# Patient Record
Sex: MALE | Race: WHITE | Employment: OTHER | URBAN - METROPOLITAN AREA
[De-identification: names, ages, dates, MRNs, and addresses within clinical notes are randomized per-mention and may not be internally consistent; named-entity substitution may affect disease eponyms.]

---

## 2020-08-31 ENCOUNTER — OFFICE VISIT (OUTPATIENT)
Dept: ORTHOPEDIC SURGERY | Age: 70
End: 2020-08-31
Payer: MEDICARE

## 2020-08-31 ENCOUNTER — TELEPHONE (OUTPATIENT)
Dept: ORTHOPEDIC SURGERY | Age: 70
End: 2020-08-31

## 2020-08-31 VITALS — WEIGHT: 175 LBS | BODY MASS INDEX: 23.7 KG/M2 | HEIGHT: 72 IN

## 2020-08-31 PROCEDURE — 1036F TOBACCO NON-USER: CPT | Performed by: ORTHOPAEDIC SURGERY

## 2020-08-31 PROCEDURE — 1123F ACP DISCUSS/DSCN MKR DOCD: CPT | Performed by: ORTHOPAEDIC SURGERY

## 2020-08-31 PROCEDURE — G8427 DOCREV CUR MEDS BY ELIG CLIN: HCPCS | Performed by: ORTHOPAEDIC SURGERY

## 2020-08-31 PROCEDURE — 3017F COLORECTAL CA SCREEN DOC REV: CPT | Performed by: ORTHOPAEDIC SURGERY

## 2020-08-31 PROCEDURE — G8420 CALC BMI NORM PARAMETERS: HCPCS | Performed by: ORTHOPAEDIC SURGERY

## 2020-08-31 PROCEDURE — 4040F PNEUMOC VAC/ADMIN/RCVD: CPT | Performed by: ORTHOPAEDIC SURGERY

## 2020-08-31 PROCEDURE — 99214 OFFICE O/P EST MOD 30 MIN: CPT | Performed by: ORTHOPAEDIC SURGERY

## 2020-08-31 RX ORDER — ALLOPURINOL 100 MG/1
100 TABLET ORAL DAILY
COMMUNITY
Start: 2020-08-18

## 2020-08-31 RX ORDER — DEXTROAMPHETAMINE SACCHARATE, AMPHETAMINE ASPARTATE, DEXTROAMPHETAMINE SULFATE AND AMPHETAMINE SULFATE 2.5; 2.5; 2.5; 2.5 MG/1; MG/1; MG/1; MG/1
10 TABLET ORAL 2 TIMES DAILY
COMMUNITY
Start: 2020-08-30

## 2020-08-31 RX ORDER — TRAZODONE HYDROCHLORIDE 50 MG/1
50 TABLET ORAL NIGHTLY
COMMUNITY

## 2020-08-31 NOTE — PROGRESS NOTES
CHIEF COMPLAINT:    Chief Complaint   Patient presents with    Knee Pain     LEFT KNEE PAIN       HISTORY OF PRESENT ILLNESS:                The patient is a 78 y.o. male who presents to clinic for evaluation of LEFT knee pain. Very pleasant gentleman presents with ongoing Lefton knee pain. He had 2 previous operations and open meniscectomy in [facility] many many years ago. Then he had a large medial incision for what sounds like ligamentous rebalancing in the [facility] out [address].  Again this was done decades ago. He really has not had a whole lot of issues until just recently. In the last couple of weeks he is noted a joint mouse. Is a large piece that primarily resides in his medial gutter but then earlier today jumped into the intercondylar notch in all likelihood. He feels like it locks his knee up very mechanically. Again, he did not really have a whole lot of symptoms other than over-the-counter NSAIDs and played active golf until just recently. 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 This is been extensively worked up. No past medical history on file. Work Status: Tired. Plays a lot of golf. The pain assessment was noted & is as follows:   ]Pain Assessment  Location of Pain: Knee  Location Modifiers: Left  Severity of Pain: 5  Quality of Pain: Buckling, Aching, Dull, Sharp  Duration of Pain: Persistent  Frequency of Pain: Intermittent  Aggravating Factors: Stairs, Walking, Kneeling, Stretching, Bending  Limiting Behavior: Some  Relieving Factors: Rest  Result of Injury: No  Work-Related Injury: No  Are there other pain locations you wish to document?: No]      Work Status/Functionality:     Past Medical History: Medical history form was reviewed today & can be found in the media tab  No past medical history on file. Past Surgical History:     No past surgical history on file.   Current Medications: No current outpatient medications on file. Allergies:  Patient has no allergy information on record. Social History:      Family History:   No family history on file. REVIEW OF SYSTEMS:   For new problems, a full review of systems will be found scanned in the patient's chart. CONSTITUTIONAL: Denies unexplained weight loss, fevers, chills   NEUROLOGICAL: Denies unsteady gait or progressive weakness  SKIN: Denies skin changes, delayed healing, rash, itching       PHYSICAL EXAM:    Vitals: There were no vitals taken for this visit. GENERAL EXAM:  · General Apparence: Patient is adequately groomed with no evidence of malnutrition. · Orientation: The patient is oriented to time, place and person. · Mood & Affect:The patient's mood and affect are appropriate       Left knee PHYSICAL EXAMINATION:  · Inspection: Visible effusion. The patient is mild varus alignment. He has a transverse incision over his medial meniscus and a long medial parapatellar serpentine incision. · Palpation: Minimally tender throughout the knee. Some tightness in the popliteal fossa. Neither he nor I can feel the loose body now. Again, it was in the medial gutter and he can easily move it around before it disappeared this morning. · Range of Motion: 5-1 and 35 degrees    · Strength: 5/5    · Special Tests: ACL MCL PCL and LCL are all intact. He does have mild pain on varus valgus testing at 30 degrees of flexion. · Skin:  There are no rashes, ulcerations or lesions. · There are no distal dysvascular changes     Gait & station:       Additional Examinations:        Straight leg raise examination. Negative logroll examination. Good hip range of motion. Contralateral knee is asymptomatic with excellent range of motion no effusion and no pain. Diagnostic Testing:   The following x rays were read and interpreted by myself      1.  3 x-ray views of the Lefton knee demonstrates near bone-on-bone medial compartment osteoarthritis and moderate patellofemoral disease. Orders   No orders of the defined types were placed in this encounter. Assessment / Treatment Plan:     1. 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 The patient was doing okay until recently. Now, he obviously has a large loose body that is quite symptomatic to him and he wants this removed. I went through in great detail with him that we can remove the loose body arthroscopically but is good to be LEFT with arthritis and this may give him ongoing issues. He voices understanding to this. 2.  He will be a video arthroscopy of the Lefton knee, removal of loose body, chondroplasty and synovectomy. We discussed the risks, benefits, and complications of arthroscopic knee surgery. The patient realizes that there are concerns with this surgery with respect to infection, deep vein thrombosis, neurological injury, delayed  rehabilitation, the possibility of arthrofibrosis of the knee, and specifically  Hoffa's fat pad fibrosis that can potentially cause difficulties. The patient realizes that there are also anesthetic concerns including cardiopulmonary issues, pulmonary issues, and even possibility of death or dystrophy. The patient voiced understanding to this as well as the normal  rehabilitation  that   is involved with weeks of physical therapy, exercise, and strengthening. The patient also realizes that even though the surgery, from a functional perspective, typically allows the patient to return to good function at about 6 weeks, that it often takes 6 months to completely rehabilitate from this operation. The patient also realizes that if there is an arthritic component to the symptoms, then they may still have some degree of arthritis pain. 3. I have personally performed and/or participated in the history, exam and medical decision making and agree with all pertinent clinical information.  I have also reviewed and agree with the past medical, family and social history unless otherwise noted. This dictation was performed with a verbal recognition program (DRAGON) and it was checked for errors. It is possible that there are still dictated errors within this office note. If so, please bring any errors to my attention for an addendum. All efforts were made to ensure that this office note is accurate.           Aiyana Gaytan MD

## 2020-09-01 ENCOUNTER — NURSE ONLY (OUTPATIENT)
Dept: ORTHOPEDIC SURGERY | Age: 70
End: 2020-09-01
Payer: MEDICARE

## 2020-09-01 VITALS — HEIGHT: 72 IN | BODY MASS INDEX: 23.7 KG/M2 | WEIGHT: 175 LBS

## 2020-09-01 RX ORDER — LIDOCAINE HYDROCHLORIDE 10 MG/ML
20 INJECTION, SOLUTION INFILTRATION; PERINEURAL ONCE
Status: COMPLETED | OUTPATIENT
Start: 2020-09-01 | End: 2020-09-01

## 2020-09-01 RX ORDER — METHYLPREDNISOLONE ACETATE 40 MG/ML
80 INJECTION, SUSPENSION INTRA-ARTICULAR; INTRALESIONAL; INTRAMUSCULAR; SOFT TISSUE ONCE
Status: COMPLETED | OUTPATIENT
Start: 2020-09-01 | End: 2020-09-01

## 2020-09-01 RX ORDER — BUPIVACAINE HYDROCHLORIDE 2.5 MG/ML
30 INJECTION, SOLUTION INFILTRATION; PERINEURAL ONCE
Status: COMPLETED | OUTPATIENT
Start: 2020-09-01 | End: 2020-09-01

## 2020-09-01 RX ADMIN — BUPIVACAINE HYDROCHLORIDE 75 MG: 2.5 INJECTION, SOLUTION INFILTRATION; PERINEURAL at 13:57

## 2020-09-01 RX ADMIN — METHYLPREDNISOLONE ACETATE 80 MG: 40 INJECTION, SUSPENSION INTRA-ARTICULAR; INTRALESIONAL; INTRAMUSCULAR; SOFT TISSUE at 13:57

## 2020-09-01 RX ADMIN — LIDOCAINE HYDROCHLORIDE 20 ML: 10 INJECTION, SOLUTION INFILTRATION; PERINEURAL at 13:58

## 2020-09-03 ENCOUNTER — TELEPHONE (OUTPATIENT)
Dept: ORTHOPEDIC SURGERY | Age: 70
End: 2020-09-03

## 2020-09-03 RX ORDER — METHYLPREDNISOLONE 4 MG/1
TABLET ORAL
Qty: 1 KIT | Refills: 0 | Status: SHIPPED | OUTPATIENT
Start: 2020-09-03 | End: 2020-09-16

## 2020-09-03 NOTE — TELEPHONE ENCOUNTER
Patient received a cortisone injection for acute onset of knee swelling. He has a loose body and is awaiting surgery. We have tried to move up the surgery but did not have any current availability. I am hoping that the loose body changes position over the next few days and will be more comfortable. However, in the interim I have offered to call him in an oral steroid. I will send this to his pharmacy.

## 2020-09-14 NOTE — PROGRESS NOTES
Emory Alfaros    Age 78 y.o.    male    1950    N [id number]    9/22/2020  Arrival Time_____________  OR Time____________[address] [place]     Procedure(s):  EXAM UNDER ANESTHESIA VIDEO ARTHROSCOPY LEFT KNEE, REMOVAL OF LOOSE BODY, CHONDROPLASTY, SYNOVECTOMY                      General    Surgeon(s):  Perico Edward, MD       Phone [phone] (home) [phone] (work)    Initals  Date  [address]  Cell Work  _________________________________________________________________  _________________________________________________________________  _________________________________________________________________  _________________________________________________________________  _________________________________________________________________      PCP _____________________________ Phone_________________       H&P__________________Bringing    Chart            Epic  DOS     Called_______  EKG__________________Bringing    Chart            Epic  DOS     Called_______  LAB__________________ Bringing    Chart            Epic  DOS     Called_______  Cardiac Clearance_______Bringing    Chart            Epic      DOS       Called_______    Cardiologist________________________ Phone___________________________      ? Christian concerns / Waiver on Chart            PAT Communications_____________  ? Pre-op Instructions Given [place]          ______________________________  ? Directions to Surgery Center                          ______________________________  ? Transportation Home_______________      _______________________________  ?  Crutches/Walker__________________        _______________________________      ________Pre-op Orders   _______Transcribed    _______Wt.  ________Pharmacy          _______SCD  ______VTE     ______Beta Blocker  ________Consent             ________TED Hiral Starling

## 2020-09-16 ENCOUNTER — OFFICE VISIT (OUTPATIENT)
Dept: PRIMARY CARE CLINIC | Age: 70
End: 2020-09-16
Payer: MEDICARE

## 2020-09-16 PROCEDURE — G8420 CALC BMI NORM PARAMETERS: HCPCS | Performed by: NURSE PRACTITIONER

## 2020-09-16 PROCEDURE — 99211 OFF/OP EST MAY X REQ PHY/QHP: CPT | Performed by: NURSE PRACTITIONER

## 2020-09-16 PROCEDURE — G8428 CUR MEDS NOT DOCUMENT: HCPCS | Performed by: NURSE PRACTITIONER

## 2020-09-16 NOTE — PROGRESS NOTES
Obstructive Sleep Apnea (LOREE) Screening     Patient:  Thierry Alba    YOB: 1950      Medical Record #:  [id number]                     Date:  9/16/2020     1. Are you a loud and/or regular snorer? []  Yes       [x] No    2. Have you been observed to gasp or stop breathing during sleep? []  Yes       [x] No    3. Do you feel tired or groggy upon awakening or do you awaken with a headache?           []  Yes       [] No    4. Are you often tired or fatigued during the wake time hours? []  Yes       [] No    5. Do you fall asleep sitting, reading, watching TV or driving? []  Yes       [] No    6. Do you often have problems with memory or concentration? []  Yes       [] No    **If patient's score is ? 3 they are considered high risk for LOREE. An Anesthesia provider will evaluate the patient and develop a plan of care the day of surgery. Note:  If the patient's BMI is more than 35 kg m¯² , has neck circumference > 40 cm, and/or high blood pressure the risk is greater (© American Sleep Apnea Association, 2006).

## 2020-09-16 NOTE — PROGRESS NOTES
Desiree Edgerton received a viral test for COVID-19. They were educated on isolation and quarantine as appropriate. For any symptoms, they were directed to seek care from their PCP, given contact information to establish with a doctor, directed to an urgent care or the emergency room.

## 2020-09-18 LAB — SARS-COV-2, NAA: NOT DETECTED

## 2020-09-21 ENCOUNTER — ANESTHESIA EVENT (OUTPATIENT)
Dept: OPERATING ROOM | Age: 70
End: 2020-09-21
Payer: MEDICARE

## 2020-09-22 ENCOUNTER — ANESTHESIA (OUTPATIENT)
Dept: OPERATING ROOM | Age: 70
End: 2020-09-22
Payer: MEDICARE

## 2020-09-22 ENCOUNTER — HOSPITAL ENCOUNTER (OUTPATIENT)
Age: 70
Setting detail: OUTPATIENT SURGERY
Discharge: HOME | End: 2020-09-22
Attending: ORTHOPAEDIC SURGERY | Admitting: ORTHOPAEDIC SURGERY
Payer: MEDICARE

## 2020-09-22 VITALS
RESPIRATION RATE: 10 BRPM | OXYGEN SATURATION: 99 % | DIASTOLIC BLOOD PRESSURE: 63 MMHG | SYSTOLIC BLOOD PRESSURE: 109 MMHG

## 2020-09-22 VITALS
HEART RATE: 59 BPM | SYSTOLIC BLOOD PRESSURE: 130 MMHG | TEMPERATURE: 97.1 F | DIASTOLIC BLOOD PRESSURE: 77 MMHG | WEIGHT: 175 LBS | HEIGHT: 72 IN | OXYGEN SATURATION: 100 % | RESPIRATION RATE: 11 BRPM | BODY MASS INDEX: 23.7 KG/M2

## 2020-09-22 PROCEDURE — 7100000010 HC PHASE II RECOVERY - FIRST 15 MIN: Performed by: ORTHOPAEDIC SURGERY

## 2020-09-22 PROCEDURE — 6370000000 HC RX 637 (ALT 250 FOR IP): Performed by: ANESTHESIOLOGY

## 2020-09-22 PROCEDURE — 2709999900 HC NON-CHARGEABLE SUPPLY: Performed by: ORTHOPAEDIC SURGERY

## 2020-09-22 PROCEDURE — 2580000003 HC RX 258: Performed by: ORTHOPAEDIC SURGERY

## 2020-09-22 PROCEDURE — 7100000001 HC PACU RECOVERY - ADDTL 15 MIN: Performed by: ORTHOPAEDIC SURGERY

## 2020-09-22 PROCEDURE — 3700000001 HC ADD 15 MINUTES (ANESTHESIA): Performed by: ORTHOPAEDIC SURGERY

## 2020-09-22 PROCEDURE — 3600000004 HC SURGERY LEVEL 4 BASE: Performed by: ORTHOPAEDIC SURGERY

## 2020-09-22 PROCEDURE — 3700000000 HC ANESTHESIA ATTENDED CARE: Performed by: ORTHOPAEDIC SURGERY

## 2020-09-22 PROCEDURE — 3600000014 HC SURGERY LEVEL 4 ADDTL 15MIN: Performed by: ORTHOPAEDIC SURGERY

## 2020-09-22 PROCEDURE — 6360000002 HC RX W HCPCS: Performed by: ORTHOPAEDIC SURGERY

## 2020-09-22 PROCEDURE — 7100000000 HC PACU RECOVERY - FIRST 15 MIN: Performed by: ORTHOPAEDIC SURGERY

## 2020-09-22 PROCEDURE — 2580000003 HC RX 258: Performed by: ANESTHESIOLOGY

## 2020-09-22 PROCEDURE — 7100000011 HC PHASE II RECOVERY - ADDTL 15 MIN: Performed by: ORTHOPAEDIC SURGERY

## 2020-09-22 PROCEDURE — 2500000003 HC RX 250 WO HCPCS: Performed by: NURSE ANESTHETIST, CERTIFIED REGISTERED

## 2020-09-22 PROCEDURE — 2500000003 HC RX 250 WO HCPCS: Performed by: ORTHOPAEDIC SURGERY

## 2020-09-22 PROCEDURE — 6360000002 HC RX W HCPCS: Performed by: NURSE ANESTHETIST, CERTIFIED REGISTERED

## 2020-09-22 RX ORDER — MEPERIDINE HYDROCHLORIDE 50 MG/ML
12.5 INJECTION INTRAMUSCULAR; INTRAVENOUS; SUBCUTANEOUS EVERY 5 MIN PRN
Status: DISCONTINUED | OUTPATIENT
Start: 2020-09-22 | End: 2020-09-22 | Stop reason: HOSPADM

## 2020-09-22 RX ORDER — LABETALOL HYDROCHLORIDE 5 MG/ML
5 INJECTION, SOLUTION INTRAVENOUS EVERY 10 MIN PRN
Status: DISCONTINUED | OUTPATIENT
Start: 2020-09-22 | End: 2020-09-22 | Stop reason: HOSPADM

## 2020-09-22 RX ORDER — DIPHENHYDRAMINE HYDROCHLORIDE 50 MG/ML
12.5 INJECTION INTRAMUSCULAR; INTRAVENOUS
Status: DISCONTINUED | OUTPATIENT
Start: 2020-09-22 | End: 2020-09-22 | Stop reason: HOSPADM

## 2020-09-22 RX ORDER — MORPHINE SULFATE 2 MG/ML
2 INJECTION, SOLUTION INTRAMUSCULAR; INTRAVENOUS EVERY 5 MIN PRN
Status: DISCONTINUED | OUTPATIENT
Start: 2020-09-22 | End: 2020-09-22 | Stop reason: HOSPADM

## 2020-09-22 RX ORDER — SODIUM CHLORIDE 0.9 % (FLUSH) 0.9 %
10 SYRINGE (ML) INJECTION EVERY 12 HOURS SCHEDULED
Status: DISCONTINUED | OUTPATIENT
Start: 2020-09-22 | End: 2020-09-22 | Stop reason: HOSPADM

## 2020-09-22 RX ORDER — MIDAZOLAM HYDROCHLORIDE 1 MG/ML
INJECTION INTRAMUSCULAR; INTRAVENOUS PRN
Status: DISCONTINUED | OUTPATIENT
Start: 2020-09-22 | End: 2020-09-22 | Stop reason: SDUPTHER

## 2020-09-22 RX ORDER — MORPHINE SULFATE 2 MG/ML
1 INJECTION, SOLUTION INTRAMUSCULAR; INTRAVENOUS EVERY 5 MIN PRN
Status: DISCONTINUED | OUTPATIENT
Start: 2020-09-22 | End: 2020-09-22 | Stop reason: HOSPADM

## 2020-09-22 RX ORDER — FLUTICASONE PROPIONATE 50 MCG
1 SPRAY, SUSPENSION (ML) NASAL DAILY PRN
COMMUNITY

## 2020-09-22 RX ORDER — HYDRALAZINE HYDROCHLORIDE 20 MG/ML
5 INJECTION INTRAMUSCULAR; INTRAVENOUS EVERY 10 MIN PRN
Status: DISCONTINUED | OUTPATIENT
Start: 2020-09-22 | End: 2020-09-22 | Stop reason: HOSPADM

## 2020-09-22 RX ORDER — BUPIVACAINE HYDROCHLORIDE 2.5 MG/ML
INJECTION, SOLUTION INFILTRATION; PERINEURAL PRN
Status: DISCONTINUED | OUTPATIENT
Start: 2020-09-22 | End: 2020-09-22 | Stop reason: ALTCHOICE

## 2020-09-22 RX ORDER — LIDOCAINE HYDROCHLORIDE 10 MG/ML
1 INJECTION, SOLUTION EPIDURAL; INFILTRATION; INTRACAUDAL; PERINEURAL
Status: DISCONTINUED | OUTPATIENT
Start: 2020-09-22 | End: 2020-09-22 | Stop reason: HOSPADM

## 2020-09-22 RX ORDER — SODIUM CHLORIDE 0.9 % (FLUSH) 0.9 %
10 SYRINGE (ML) INJECTION PRN
Status: DISCONTINUED | OUTPATIENT
Start: 2020-09-22 | End: 2020-09-22 | Stop reason: HOSPADM

## 2020-09-22 RX ORDER — OXYCODONE HYDROCHLORIDE AND ACETAMINOPHEN 5; 325 MG/1; MG/1
2 TABLET ORAL PRN
Status: COMPLETED | OUTPATIENT
Start: 2020-09-22 | End: 2020-09-22

## 2020-09-22 RX ORDER — FENTANYL CITRATE 50 UG/ML
INJECTION, SOLUTION INTRAMUSCULAR; INTRAVENOUS PRN
Status: DISCONTINUED | OUTPATIENT
Start: 2020-09-22 | End: 2020-09-22 | Stop reason: SDUPTHER

## 2020-09-22 RX ORDER — DEXAMETHASONE SODIUM PHOSPHATE 4 MG/ML
INJECTION, SOLUTION INTRA-ARTICULAR; INTRALESIONAL; INTRAMUSCULAR; INTRAVENOUS; SOFT TISSUE PRN
Status: DISCONTINUED | OUTPATIENT
Start: 2020-09-22 | End: 2020-09-22 | Stop reason: SDUPTHER

## 2020-09-22 RX ORDER — ONDANSETRON 2 MG/ML
INJECTION INTRAMUSCULAR; INTRAVENOUS PRN
Status: DISCONTINUED | OUTPATIENT
Start: 2020-09-22 | End: 2020-09-22 | Stop reason: SDUPTHER

## 2020-09-22 RX ORDER — LIDOCAINE HYDROCHLORIDE 20 MG/ML
INJECTION, SOLUTION INFILTRATION; PERINEURAL PRN
Status: DISCONTINUED | OUTPATIENT
Start: 2020-09-22 | End: 2020-09-22 | Stop reason: SDUPTHER

## 2020-09-22 RX ORDER — PROMETHAZINE HYDROCHLORIDE 25 MG/ML
6.25 INJECTION, SOLUTION INTRAMUSCULAR; INTRAVENOUS
Status: DISCONTINUED | OUTPATIENT
Start: 2020-09-22 | End: 2020-09-22 | Stop reason: HOSPADM

## 2020-09-22 RX ORDER — OXYCODONE HYDROCHLORIDE AND ACETAMINOPHEN 5; 325 MG/1; MG/1
1 TABLET ORAL PRN
Status: COMPLETED | OUTPATIENT
Start: 2020-09-22 | End: 2020-09-22

## 2020-09-22 RX ORDER — SODIUM CHLORIDE, SODIUM LACTATE, POTASSIUM CHLORIDE, CALCIUM CHLORIDE 600; 310; 30; 20 MG/100ML; MG/100ML; MG/100ML; MG/100ML
INJECTION, SOLUTION INTRAVENOUS CONTINUOUS
Status: DISCONTINUED | OUTPATIENT
Start: 2020-09-22 | End: 2020-09-22 | Stop reason: HOSPADM

## 2020-09-22 RX ORDER — ONDANSETRON 2 MG/ML
4 INJECTION INTRAMUSCULAR; INTRAVENOUS PRN
Status: DISCONTINUED | OUTPATIENT
Start: 2020-09-22 | End: 2020-09-22 | Stop reason: HOSPADM

## 2020-09-22 RX ORDER — PROPOFOL 10 MG/ML
INJECTION, EMULSION INTRAVENOUS PRN
Status: DISCONTINUED | OUTPATIENT
Start: 2020-09-22 | End: 2020-09-22 | Stop reason: SDUPTHER

## 2020-09-22 RX ORDER — HYDROCODONE BITARTRATE AND ACETAMINOPHEN 5; 325 MG/1; MG/1
1 TABLET ORAL EVERY 4 HOURS PRN
Qty: 30 TABLET | Refills: 0 | Status: SHIPPED | OUTPATIENT
Start: 2020-09-22 | End: 2020-09-27

## 2020-09-22 RX ORDER — SODIUM CHLORIDE, SODIUM LACTATE, POTASSIUM CHLORIDE, AND CALCIUM CHLORIDE .6; .31; .03; .02 G/100ML; G/100ML; G/100ML; G/100ML
IRRIGANT IRRIGATION PRN
Status: DISCONTINUED | OUTPATIENT
Start: 2020-09-22 | End: 2020-09-22 | Stop reason: ALTCHOICE

## 2020-09-22 RX ORDER — ASPIRIN 325 MG
325 TABLET, DELAYED RELEASE (ENTERIC COATED) ORAL DAILY
Qty: 14 TABLET | Refills: 0 | Status: SHIPPED | OUTPATIENT
Start: 2020-09-22 | End: 2020-10-06

## 2020-09-22 RX ADMIN — FENTANYL CITRATE 50 MCG: 50 INJECTION INTRAMUSCULAR; INTRAVENOUS at 10:47

## 2020-09-22 RX ADMIN — CEFAZOLIN SODIUM 2 G: 10 INJECTION, POWDER, FOR SOLUTION INTRAVENOUS at 10:44

## 2020-09-22 RX ADMIN — LIDOCAINE HYDROCHLORIDE 60 MG: 20 INJECTION, SOLUTION INFILTRATION; PERINEURAL at 10:47

## 2020-09-22 RX ADMIN — MIDAZOLAM HYDROCHLORIDE 2 MG: 2 INJECTION, SOLUTION INTRAMUSCULAR; INTRAVENOUS at 10:44

## 2020-09-22 RX ADMIN — PROPOFOL 200 MG: 10 INJECTION, EMULSION INTRAVENOUS at 10:47

## 2020-09-22 RX ADMIN — ONDANSETRON 4 MG: 2 INJECTION INTRAMUSCULAR; INTRAVENOUS at 10:50

## 2020-09-22 RX ADMIN — OXYCODONE HYDROCHLORIDE AND ACETAMINOPHEN 2 TABLET: 5; 325 TABLET ORAL at 11:57

## 2020-09-22 RX ADMIN — SODIUM CHLORIDE, POTASSIUM CHLORIDE, SODIUM LACTATE AND CALCIUM CHLORIDE: 600; 310; 30; 20 INJECTION, SOLUTION INTRAVENOUS at 08:51

## 2020-09-22 RX ADMIN — DEXAMETHASONE SODIUM PHOSPHATE 6 MG: 4 INJECTION, SOLUTION INTRAMUSCULAR; INTRAVENOUS at 10:50

## 2020-09-22 ASSESSMENT — PAIN SCALES - GENERAL
PAINLEVEL_OUTOF10: 0
PAINLEVEL_OUTOF10: 7
PAINLEVEL_OUTOF10: 6
PAINLEVEL_OUTOF10: 0
PAINLEVEL_OUTOF10: 7
PAINLEVEL_OUTOF10: 0
PAINLEVEL_OUTOF10: 6
PAINLEVEL_OUTOF10: 7

## 2020-09-22 ASSESSMENT — PULMONARY FUNCTION TESTS
PIF_VALUE: 1
PIF_VALUE: 0
PIF_VALUE: 6
PIF_VALUE: 22
PIF_VALUE: 2
PIF_VALUE: 2
PIF_VALUE: 22
PIF_VALUE: 15
PIF_VALUE: 12
PIF_VALUE: 2
PIF_VALUE: 0
PIF_VALUE: 21
PIF_VALUE: 2
PIF_VALUE: 2
PIF_VALUE: 1
PIF_VALUE: 2
PIF_VALUE: 23
PIF_VALUE: 2
PIF_VALUE: 2
PIF_VALUE: 13
PIF_VALUE: 2
PIF_VALUE: 2
PIF_VALUE: 13
PIF_VALUE: 2
PIF_VALUE: 12
PIF_VALUE: 22
PIF_VALUE: 11
PIF_VALUE: 13
PIF_VALUE: 1
PIF_VALUE: 22
PIF_VALUE: 2
PIF_VALUE: 0
PIF_VALUE: 22
PIF_VALUE: 24
PIF_VALUE: 12

## 2020-09-22 NOTE — H&P
I have reviewed the history and physical and examined the patient and find no relevant changes. I have reviewed with the patient and/or family the risks, benefits, and alternatives to the procedure. Controlled Substance Monitoring:    Acute and Chronic Pain Monitoring:   RX Monitoring 9/22/2020   Acute Pain Prescriptions Severe pain not adequately treated with lower dose. Periodic Controlled Substance Monitoring No signs of potential drug abuse or diversion identified. Patient being given increased dosage/quantity of opoid pain medication in excess of OSMB guidelines which noted a 30 MED daily of opioids due to the fact that he/she has undergone orthopaedic surgery as outlined in rule 4731-11-13. Dosages and further duration of the pain medication will be re-evaluated at her post op visit. Patient was educated on dosing expectations and limits of prescribing as a result of the new [facility] Board rules enacted August 31, 2017. OARRS report has also been utilized to screen for any abuse history or suspicious activity as outlined in [place]. All efforts have been taken to prevent abuse potential and misuse of opioid medications including education, screening, and close clinical follow up.

## 2020-09-22 NOTE — ANESTHESIA PRE PROCEDURE
Department of Anesthesiology  Preprocedure Note       Name:  Desiree Bhakta   Age:  78 y.o.  :  1950                                          MRN:  [id number]         Date:  2020      Surgeon: Ralf Harper):  Mono Mendoza MD    Procedure: Procedure(s):  EXAM UNDER ANESTHESIA VIDEO ARTHROSCOPY LEFT KNEE, REMOVAL OF LOOSE BODY, CHONDROPLASTY, SYNOVECTOMY    Medications prior to admission:   Prior to Admission medications    Medication Sig Start Date End Date Taking? Authorizing Provider   fluticasone (FLONASE) 50 MCG/ACT nasal spray 1 spray by Each Nostril route daily as needed for Rhinitis   Yes Historical Provider, MD   amphetamine-dextroamphetamine (ADDERALL) 10 MG tablet Take 10 mg by mouth 2 times daily. 20  Yes Historical Provider, MD   traZODone (DESYREL) 50 MG tablet Take 50 mg by mouth nightly   Yes Historical Provider, MD   allopurinol (ZYLOPRIM) 100 MG tablet Take 100 mg by mouth daily  20  Yes Historical Provider, MD       Current medications:    Current Facility-Administered Medications   Medication Dose Route Frequency Provider Last Rate Last Dose    ceFAZolin (ANCEF) 2 g in dextrose 5 % 100 mL IVPB  2 g Intravenous On Call to Keisha Morse MD        lactated ringers infusion   Intravenous Continuous Dina Alves MD        sodium chloride flush 0.9 % injection 10 mL  10 mL Intravenous 2 times per day Dina Alves MD        sodium chloride flush 0.9 % injection 10 mL  10 mL Intravenous PRN Dina Alves MD        lidocaine PF 1 % injection 1 mL  1 mL Intradermal Once PRN Dina Alves MD           Allergies: Allergies   Allergen Reactions    Mometasone Furo-Formoterol Fum Other (See Comments)       Problem List:  There is no problem list on file for this patient.       Past Medical History:        Diagnosis Date    Arthritis     Cancer [facility])     prostate    Lung infection     recurring lung infection- do not not what the cause is       Past Surgical History:        Procedure Laterality Date    CERVICAL DISC SURGERY      COLECTOMY      due to diverticulitis    KNEE ARTHROSCOPY Left     X 2    PROSTATECTOMY      SHOULDER ARTHROSCOPY Bilateral     acromioplasty       Social History:    Social History     Tobacco Use    Smoking status: Former Smoker     Types: Cigarettes     Last attempt to quit: 1980     Years since quittin.0    Smokeless tobacco: Never Used   Substance Use Topics    Alcohol use: Yes     Comment: 14 drinks per week                                Counseling given: Not Answered      Vital Signs (Current):   Vitals:    20 1523   Weight: 175 lb (79.4 kg)   Height: 6' (1.829 m)                                              BP Readings from Last 3 Encounters:   No data found for BP       NPO Status: Time of last liquid consumption:                         Time of last solid consumption:                         Date of last liquid consumption: 20                        Date of last solid food consumption: 20    BMI:   Wt Readings from Last 3 Encounters:   20 175 lb (79.4 kg)   20 175 lb (79.4 kg)   20 175 lb (79.4 kg)     Body mass index is 23.73 kg/m². CBC: No results found for: WBC, RBC, HGB, HCT, MCV, RDW, PLT    CMP: No results found for: NA, K, CL, CO2, BUN, CREATININE, GFRAA, AGRATIO, LABGLOM, GLUCOSE, PROT, CALCIUM, BILITOT, ALKPHOS, AST, ALT    POC Tests: No results for input(s): POCGLU, POCNA, POCK, POCCL, POCBUN, POCHEMO, POCHCT in the last 72 hours.     Coags: No results found for: PROTIME, INR, APTT    HCG (If Applicable): No results found for: PREGTESTUR, PREGSERUM, HCG, HCGQUANT     ABGs: No results found for: PHART, PO2ART, UAP3CIR, UKQ0WEW, BEART, I3YEOPBY     Type & Screen (If Applicable):  No results found for: LABABO, LABRH    Drug/Infectious Status (If Applicable):  No results found for: HIV, HEPCAB    COVID-19 Screening (If Applicable):   Lab Results   Component Value Date    COVID19 NOT DETECTED 2020         Anesthesia Evaluation  Patient summary reviewed no history of anesthetic complications:   Airway: Mallampati: II  TM distance: >3 FB   Neck ROM: full  Mouth opening: > = 3 FB Dental: normal exam         Pulmonary:Negative Pulmonary ROS and normal exam  breath sounds clear to auscultation                            ROS comment: Recurrent lung infxn   Cardiovascular:Negative CV ROS  Exercise tolerance: good (>4 METS),           Rhythm: regular  Rate: normal                    Neuro/Psych:   Negative Neuro/Psych ROS              GI/Hepatic/Renal: Neg GI/Hepatic/Renal ROS            Endo/Other: Negative Endo/Other ROS                    Abdominal:           Vascular: negative vascular ROS. Pre-Operative Diagnosis: Loose body of left knee [M23.42]    78 y.o.   BMI:  Body mass index is 23.73 kg/m². Vitals:    20 1523   Weight: 175 lb (79.4 kg)   Height: 6' (1.829 m)       Allergies   Allergen Reactions    Mometasone Furo-Formoterol Fum Other (See Comments)       Social History     Tobacco Use    Smoking status: Former Smoker     Types: Cigarettes     Last attempt to quit: 1980     Years since quittin.0    Smokeless tobacco: Never Used   Substance Use Topics    Alcohol use: Yes     Comment: 14 drinks per week       LABS:    CBC  No results found for: WBC, HGB, HCT, PLT  RENAL  No results found for: NA, K, CL, CO2, BUN, CREATININE, GLUCOSE  COAGS  No results found for: PROTIME, INR, APTT          Anesthesia Plan      MAC     ASA 2     (I discussed with the patient the risks and benefits of PIV, anesthesia, IV Narcotics, PACU. All questions were answered the patient agrees with the plan and wishes to proceed)  Induction: intravenous.                           Shanice Benson MD   2020

## 2020-09-22 NOTE — PROGRESS NOTES
Pre and post operative expectations and routines explained to patient, verbalized understanding. Preoperative Screening for Elective Surgery/Invasive Procedures While COVID-19 present in the community     Have you tested positive or have been told to self-isolate for COVID-19 like symptoms within the past 28 days? NO   Do you currently have any of the following symptoms? No  o Fever >100.0 F or 99.9 F in immunocompromised patients? No  o New onset cough, shortness of breath or difficulty breathing? No  o New onset sore throat, myalgia (muscle aches and pains), headache, loss of taste/smell or diarrhea? No   Have you had a potential exposure to COVID-19 within the past 14 days by:  o Close contact with a confirmed case? No  o Close contact with a healthcare worker,  or essential infrastructure worker (grocery store, [employer], gas station, public utilities or transportation)? No  o Do you reside in a congregate setting such as; skilled nursing facility, adult home, correctional facility, homeless shelter or other institutional setting? No  o Have you had recent travel to a known COVID-19 hotspot? No    Indicate if the patient has a positive screen by answering yes to one or more of the above questions. Patients who test positive or screen positive prior to surgery or on the day of surgery should be evaluated in conjunction with the surgeon/proceduralist/anesthesiologist to determine the urgency of the procedure. Patient states he has crutches and knows how to use them.

## 2020-09-22 NOTE — PROGRESS NOTES
Patient awake alert ready to go home. Discharged in no distress accompanied to passenger side of car with family or significant other driving car. Assessment unchanged. Patient states pain slightly diminished.

## 2020-09-22 NOTE — BRIEF OP NOTE
Brief Postoperative Note      Patient: Emilie Perkins  YOB: 1950  MRN: [id number]    Date of Procedure: 9/22/2020    Pre-Op Diagnosis: Loose body of left knee [M23.42]    Post-Op Diagnosis: Same       Procedure(s):  EXAM UNDER ANESTHESIA VIDEO ARTHROSCOPY LEFT KNEE, REMOVAL OF LOOSE BODY, CHONDROPLASTY, SYNOVECTOMY    Surgeon(s):  Tia Rodríguez MD    Assistant:  Surgical Assistant: Paula Dawson  Physician Assistant: OTONIEL Schultz    Anesthesia: General    Estimated Blood Loss (mL): Minimal    Complications: None    Specimens:   * No specimens in log *    Implants:  * No implants in log *      Drains: * No LDAs found *    Findings: same    Electronically signed by OTONIEL Schultz on 9/22/2020 at 11:04 AM

## 2020-09-22 NOTE — ANESTHESIA POSTPROCEDURE EVALUATION
Department of Anesthesiology  Postprocedure Note    Patient: Justin Orellana  MRN: [id number]  YOB: 1950  Date of evaluation: 9/22/2020  Time:  3:34 PM     Procedure Summary     Date:  09/22/20 Room / Location:  [address]    Anesthesia Start:  4228 Anesthesia Stop:  7325    Procedure:  EXAM UNDER ANESTHESIA VIDEO ARTHROSCOPY LEFT KNEE, REMOVAL OF MULTIPLE LOOSE BODIES, CHONDROPLASTY, SYNOVECTOMY (Left Knee) Diagnosis:       Loose body of left knee      (Loose body of left knee [M23.42])    Surgeon:  Diane Guevara MD Responsible Provider:  Cheri Leal MD    Anesthesia Type:  MAC ASA Status:  2          Anesthesia Type: MAC    Zaina Phase I: Zaina Score: 9    Zaina Phase II: Zaina Score: 10    Last vitals: Reviewed and per EMR flowsheets.        Anesthesia Post Evaluation    Comments: Postoperative Anesthesia Note    Name:    Justin Orellana  MRN:      [id number]    Patient Vitals in the past 12 hrs:  09/22/20 1230, BP:130/77, Pulse:59, Resp:11, SpO2:100 %  09/22/20 1220, BP:129/78, Pulse:57, Resp:9, SpO2:100 %  09/22/20 1218, Pulse:58  09/22/20 1210, BP:105/61, Pulse:57, Resp:16, SpO2:98 %  09/22/20 1200, BP:121/76, Pulse:59, Resp:13, SpO2:98 %  09/22/20 1155, BP:117/76, Pulse:60, Resp:11, SpO2:99 %  09/22/20 1150, BP:131/74, Pulse:58, Resp:13, SpO2:98 %  09/22/20 1148, Pulse:64, SpO2:98 %  09/22/20 1146, BP:104/72, Pulse:64, Resp:22, SpO2:97 %  09/22/20 1145, BP:104/72, Temp:97.1 °F (36.2 °C), Pulse:54, Resp:11, SpO2:97 %  09/22/20 1140, BP:105/69, Pulse:55, Resp:10, SpO2:97 %  09/22/20 1135, BP:106/69, Pulse:55, Resp:11, SpO2:97 %  09/22/20 1133, BP:106/65, Temp:97 °F (36.1 °C), Temp src:Temporal, Pulse:55, Resp:12, SpO2:97 %     LABS:    CBC  No results found for: WBC, HGB, HCT, PLT  RENAL  No results found for: NA, K, CL, CO2, BUN, CREATININE, GLUCOSE  COAGS  No results found for: PROTIME, INR, APTT    Intake & Output:  @24HRIO@    Nausea & Vomiting: No    Level of Consciousness:  Awake    Pain Assessment:  Adequate analgesia    Anesthesia Complications:  No apparent anesthetic complications    SUMMARY      Vital signs stable  OK to discharge from Stage I post anesthesia care.   Care transferred from Anesthesiology department on discharge from perioperative area

## 2020-09-23 NOTE — OP NOTE
the loose bodies in hopes of trying to  give him some relief from the synovitis and arthritis pain and  mechanical symptoms. He like to have this done. We did discuss the  risks, benefits, and potential complications of the operation as well as  a normal rehabilitative protocol. He voiced understanding to the fact  that he may very well continue to have a significant arthritis pain and  effusions, but he also understood the concerns regarding infection, deep  vein thrombosis,  pulmonary embolism, arthrofibrosis, delayed  rehabilitation, anesthetic complications including death,  cardiopulmonary issues, etc.  All questions were answered. I did talk  to this man in the preanesthesia holding area and signed his knee. DETAILS OF SURGERY:  The patient was taken to the operating room and  placed on the operating room table in the supine position. General  anesthesia was induced and maintained without difficulty. Exam under  anesthesia demonstrated a 2+ effusion and some crepitus with range of  motion of the knee. The leg was then positioned, prepped and draped. Routine arthroscopic  portals were established. Tricompartmental examination was completed. The medial compartment was examined first.  The patient had basically a  calcified meniscus piece about 1 x 1.3 cm anteriorly. This was  impinging upon the joint and this was removed. He really did not have  any meniscus left at all. 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 Moving to the intercondylar notch, the ACL was visible, the PCL was  intact. The lateral compartment demonstrated some synovitis, but really no  significant arthritic change. Patellofemoral compartment was extremely arthritic.   The patient had a  multitude of loose bodies up into the suprapatellar pouch and this  extended down into the lateral compartment as well. The loose bodies  were removed anterolaterally. The grade 3 areas of chondromalacia  involving the patellofemoral articulation underwent a chondroplasty. Any remaining pathologic synovium was resected and the instruments were  removed. Dry sterile dressings were applied over Steri-Strips. The  patient went to recovery room postprocedure stable.         Rossy Rosa MD    D: 09/22/2020 11:37:40       T: 09/22/2020 20:33:06     [email or web address]  Job#: [id number]     Doc#: [id number]    CC:

## 2020-09-24 ENCOUNTER — HOSPITAL ENCOUNTER (OUTPATIENT)
Dept: PHYSICAL THERAPY | Age: 70
Setting detail: THERAPIES SERIES
Discharge: HOME | End: 2020-09-24
Payer: MEDICARE

## 2020-09-24 PROCEDURE — 97110 THERAPEUTIC EXERCISES: CPT | Performed by: PHYSICAL THERAPIST

## 2020-09-24 PROCEDURE — G0283 ELEC STIM OTHER THAN WOUND: HCPCS | Performed by: PHYSICAL THERAPIST

## 2020-09-24 PROCEDURE — 97016 VASOPNEUMATIC DEVICE THERAPY: CPT | Performed by: PHYSICAL THERAPIST

## 2020-09-24 PROCEDURE — 97161 PT EVAL LOW COMPLEX 20 MIN: CPT | Performed by: PHYSICAL THERAPIST

## 2020-09-24 NOTE — PLAN OF CARE
regarding ROS issues if not already being addressed at this time. Co-morbidities/Complexities (which will affect course of rehabilitation):   []None           Arthritic conditions   []Rheumatoid arthritis (M05.9)  [x]Osteoarthritis (M19.91)   Cardiovascular conditions   []Hypertension (I10)  []Hyperlipidemia (E78.5)  []Angina pectoris (I20)  []Atherosclerosis (I70)   Musculoskeletal conditions   []Disc pathology   []Congenital spine pathologies   []Prior surgical intervention  []Osteoporosis (M81.8)  []Osteopenia (M85.8)   Endocrine conditions   []Hypothyroid (E03.9)  []Hyperthyroid Gastrointestinal conditions   []Constipation (Q06.36)   Metabolic conditions   []Morbid obesity (E66.01)  []Diabetes type 1(E10.65) or 2 (E11.65)   []Neuropathy (G60.9)     Pulmonary conditions   []Asthma (J45)  []Coughing   []COPD (J44.9)   Psychological Disorders  [x]Anxiety (F41.9)  [x]Depression (F32.9)   []Other:   [x]Other:    Prostate removed 2013; has vertigo       Barriers to/and or personal factors that will affect rehab potential:              []Age  []Sex              []Motivation/Lack of Motivation                        [x]Co-Morbidities              []Cognitive Function, education/learning barriers              []Environmental, home barriers              [x]profession/work barriers  [x]past PT/medical experience  []other:  Justification: pt is very active and trying to re build his deck. He has balance issues which may cause slowed gait progress. Also has hx of much swelling with previous sx. Falls Risk Assessment (30 days): pt is a fall risk due to LOB due to balance issued (chronic)  [] Falls Risk assessed and no intervention required.   [x] Falls Risk assessed and Patient requires intervention due to being higher risk   TUG score (>12s at risk):     [] Falls education provided, including crutch use, gait training, stirs with handrail          ASSESSMENT:   Functional Impairments:     []Noted lumbar/proximal hip/LE joint hypomobility   [x]Decreased LE functional ROM   [x]Decreased core/proximal hip strength and neuromuscular control   [x]Decreased LE functional strength   [x]Reduced balance/proprioceptive control   []other:      Functional Activity Limitations (from functional questionnaire and intake)   [x]Reduced ability to tolerate prolonged functional positions   []Reduced ability or difficulty with changes of positions or transfers between positions   [x]Reduced ability to maintain good posture and demonstrate good body mechanics with sitting, bending, and lifting   [x]Reduced ability to sleep   [] Reduced ability or tolerance with driving and/or computer work   [x]Reduced ability to perform lifting, carrying tasks   [x]Reduced ability to squat   [x]Reduced ability to forward bend   [x]Reduced ability to ambulate prolonged functional periods/distances/surfaces   [x]Reduced ability to ascend/descend stairs   [x]Reduced ability to run, hop, cut or jump   []other:    Participation Restrictions   []Reduced participation in self care activities   []Reduced participation in home management activities   []Reduced participation in work activities   []Reduced participation in social activities. []Reduced participation in sport/recreation activities. Classification :    [x]Signs/symptoms consistent with post-surgical status including decreased ROM, strength and function.    []Signs/symptoms consistent with joint sprain/strain   []Signs/symptoms consistent with patella-femoral syndrome   []Signs/symptoms consistent with knee OA/hip OA   []Signs/symptoms consistent with internal derangement of knee/Hip   []Signs/symptoms consistent with functional hip weakness/NMR control      []Signs/symptoms consistent with tendinitis/tendinosis    []signs/symptoms consistent with pathology which may benefit from Dry needling      []other:      Prognosis/Rehab Potential:      []Excellent   [x]Good    []Fair   []Poor    Tolerance of evaluation/treatment:    []Excellent   [x]Good    []Fair   []Poor  Physical Therapy Evaluation Complexity Justification  [x] A history of present problem with:  [] no personal factors and/or comorbidities that impact the plan of care;  []1-2 personal factors and/or comorbidities that impact the plan of care  [x]3 personal factors and/or comorbidities that impact the plan of care  [x] An examination of body systems using standardized tests and measures addressing any of the following: body structures and functions (impairments), activity limitations, and/or participation restrictions;:  [x] a total of 1-2 or more elements   [] a total of 3 or more elements   [] a total of 4 or more elements   [x] A clinical presentation with:  [x] stable and/or uncomplicated characteristics   [] evolving clinical presentation with changing characteristics  [] unstable and unpredictable characteristics;   [x] Clinical decision making of [x] low, [] moderate, [] high complexity using standardized patient assessment instrument and/or measurable assessment of functional outcome. [x] EVAL (LOW) 79576 (typically 20 minutes face-to-face)  [] EVAL (MOD) 22218 (typically 30 minutes face-to-face)  [] EVAL (HIGH) 93102 (typically 45 minutes face-to-face)  [] RE-EVAL       PLAN:   Frequency/Duration:  2 days per week for 8 Weeks:  Interventions:  [x]  Therapeutic exercise including: strength training, ROM, for Lower extremity and core   [x]  NMR activation and proprioception for LE, Glutes and Core   [x]  Manual therapy as indicated for LE, Hip and spine to include: Dry Needling/IASTM, STM, PROM, Gr I-IV mobilizations, manipulation. [x] Modalities as needed that may include: thermal agents, E-stim, Biofeedback, US, iontophoresis as indicated  [x] Patient education on joint protection, postural re-education, activity modification, progression of HEP.     HEP instruction: HEP instruction was provided and handouts given to include:  (see scanned forms)  Access Code: [email or web address]   Patient Portal: [email or web address]/    GOALS:  Patient stated goal: To walk and play golf again. [] Progressing: [] Met: [] Not Met: [] Adjusted    Therapist goals for Patient:   Short Term Goals: To be achieved in: 2 weeks  1. Independent in HEP and progression per patient tolerance, in order to prevent re-injury. [] Progressing: [] Met: [] Not Met: [] Adjusted  2. Patient will have a decrease in pain to facilitate improvement in movement, function, and ADLs as indicated by Functional Deficits. [] Progressing: [] Met: [] Not Met: [] Adjusted    Long Term Goals: To be achieved in: 8 weeks  1. Disability index score of 44% or less for the LEFS to assist with reaching prior level of function. [] Progressing: [] Met: [] Not Met: [] Adjusted  2. Patient will demonstrate increased AROM to -5 to 130 to allow for proper joint functioning as indicated by patients Functional Deficits. [] Progressing: [] Met: [] Not Met: [] Adjusted  3. Patient will demonstrate an increase in Strength to good proximal hip strength and control, 4+/5 in LE to allow for proper functional mobility as indicated by patients Functional Deficits. [] Progressing: [] Met: [] Not Met: [] Adjusted  4. Patient will return to daily yardwork functional activities without increased symptoms or restriction. [] Progressing: [] Met: [] Not Met: [] Adjusted  5.  Pt will be able to golf 9 holes without restrictions from knee pain. (patient specific functional goal)    [] Progressing: [] Met: [] Not Met: [] Adjusted     Electronically signed by:  TRINY [id number]

## 2020-09-24 NOTE — FLOWSHEET NOTE
[facility],  [address] [place]  Phone: [phone]  Fax [phone]    Physical Therapy Treatment Note/ Progress Report:           Date:  2020    Patient Name:  Matteo Bui    :  1950  MRN: [id number]    Medical/Treatment Diagnosis Information:  · Diagnosis: L knee M23.42 (ICD-10-CM) - Loose body of left knee  · Treatment Diagnosis: L knee pain (M25.652)    L knee stiffness (M25.662)  Difficulty walking (R26.2); s/p L knee LBR/chomdroplasty of PFJ and medial compartment 61  Insurance/Certification information:  PT Insurance Information: Medicare/Medicaid  Physician Information:  Referring Practitioner: Vernon Fleming MD    Date of Patient follow up with Physician and OP note due: 20    Latex Allergy/Pacemaker/Adhesive allergy:  [x]NO      []YES      Is this a Progress Report:     []  Yes  [x]  No      If Yes:  Date Range for reporting period:  Beginnin20  Ending:    Progress report will be due (10 Rx or 30 days ):        Recertification will be due (POC Duration  / 90 days ): 20     Has the plan of care been signed (Y/N):        []  Yes  [x]  No         Visit # Date Range Insurance Allowable Requires auth   [address]ueva BMN    [x]no        []yes:           SUBJECTIVE:  Pain level:  4-6/10 See eval    OBJECTIVE: See eval   Observation:     PT Practice Pattern:D  Co morbidities:3+  SURgical 20 L knee LBR chondroplasty  INITIAL VISIT 20 CURRENT VISIT    PAIN 0-10/10 4-6/10    FUNCTIONAL SCALE LEFS (10/80) 88%    L knee AROM KE      KF                             STRENGHT (MMT) Quad tone                                         RESTRICTIONS/PRECAUTIONS: vertigo    Exercises/Interventions:     HEP:Access Code: [email or web address]   Patient Portal: [employer]/       Therapeutic Ex (24541) Reps/Sets/time Notes/CUES HEP   seated HS stretch  3x30\"  X    Seated calf skill, proprioception of core, proximal hip and LE for self care, mobility, lifting, and ambulation/stair navigation      Manual Treatments:  PROM / STM / Oscillations-Mobs:  G-I, II, III, IV (PA's, Inf., Post.)  [] (60929) Provided manual therapy to mobilize LE, proximal hip and/or LS spine soft tissue/joints for the purpose of modulating pain, promoting relaxation,  increasing ROM, reducing/eliminating soft tissue swelling/inflammation/restriction, improving soft tissue extensibility and allowing for proper ROM for normal function with self care, mobility, lifting and ambulation. Trigger point Dry Needling:  fine needle insertion into myofascial trigger points to stimulate a healing response  [] (87227) Needle insertion without injection: 1 or 2 muscles  [] (40259) Needle insertion without injection: 3 or more muscles    Modalities:     [x] GAME READY (VASO)- for significant edema, swelling, pain control. x15'    [x] ESU (HV/PM) for edema and pain control HV x15'      Charges:  Timed Code Treatment Minutes: 25   Total Treatment Minutes: 50         [x] EVAL (LOW) 61016 (typically 20 minutes face-to-face)  [] EVAL (MOD) 81295 (typically 30 minutes face-to-face)  [] EVAL (HIGH) 37053 (typically 45 minutes face-to-face)  [] RE-EVAL     [x] IP(12234) x 2   [] IONTO  [] NMR (10614) x     [x] VASO  [] Manual (34376) x      [] Other:  [] TA x      [] Mech Traction (50745)  [] ES(attended) (76397)      [x] ES (un) (44011):     GOALS:     Patient stated goal: To walk and play golf again. []? Progressing: []? Met: []? Not Met: []? Adjusted     Therapist goals for Patient:   Short Term Goals: To be achieved in: 2 weeks  1. Independent in HEP and progression per patient tolerance, in order to prevent re-injury. []? Progressing: []? Met: []? Not Met: []? Adjusted  2. Patient will have a decrease in pain to facilitate improvement in movement, function, and ADLs as indicated by Functional Deficits. []?  Progressing: []? Met: []? Not Met: []? Adjusted     Long Term Goals: To be achieved in: 8 weeks  1. Disability index score of 44% or less for the LEFS to assist with reaching prior level of function. []? Progressing: []? Met: []? Not Met: []? Adjusted  2. Patient will demonstrate increased AROM to -5 to 130 to allow for proper joint functioning as indicated by patients Functional Deficits. []? Progressing: []? Met: []? Not Met: []? Adjusted  3. Patient will demonstrate an increase in Strength to good proximal hip strength and control, 4+/5 in LE to allow for proper functional mobility as indicated by patients Functional Deficits. []? Progressing: []? Met: []? Not Met: []? Adjusted  4. Patient will return to daily yardwork functional activities without increased symptoms or restriction. []? Progressing: []? Met: []? Not Met: []? Adjusted  5. Pt will be able to golf 9 holes without restrictions from knee pain. (patient specific functional goal)    []? Progressing: []? Met: []? Not Met: []? Adjusted              ASSESSMENT:  See eval      Overall Progression Towards Functional goals/ Treatment Progress Update:  [] Patient is progressing as expected towards functional goals listed. [] Progression is slowed due to complexities/Impairments listed. [] Progression has been slowed due to co-morbidities.   [x] Plan just implemented, too soon to assess goals progression <30days   [] Goals require adjustment due to lack of progress  [] Patient is not progressing as expected and requires additional follow up with physician  [] Other    Prognosis for POC: [x] Good [] Fair  [] Poor      Patient requires continued skilled intervention: [x] Yes  [] No    Treatment/Activity Tolerance:  [x] Patient able to complete treatment  [] Patient limited by fatigue  [] Patient limited by pain    [] Patient limited by other medical complications  [] Other:     PLAN: See eval  [] Continue per plan of care [] Alter current plan (see comments above)  [x] Plan of care initiated [] Hold pending MD visit [] Discharge      Electronically signed by:  Peri Patel, [id number]    Note: If patient does not return for scheduled/ recommended follow up visits, this note will serve as a discharge from care along with most recent update on progress.

## 2020-09-25 ENCOUNTER — OFFICE VISIT (OUTPATIENT)
Dept: ORTHOPEDIC SURGERY | Age: 70
End: 2020-09-25

## 2020-09-25 VITALS — WEIGHT: 175 LBS | HEIGHT: 72 IN | BODY MASS INDEX: 23.7 KG/M2

## 2020-09-25 PROCEDURE — 99024 POSTOP FOLLOW-UP VISIT: CPT | Performed by: PHYSICIAN ASSISTANT

## 2020-09-30 ENCOUNTER — HOSPITAL ENCOUNTER (OUTPATIENT)
Dept: PHYSICAL THERAPY | Age: 70
Setting detail: THERAPIES SERIES
Discharge: HOME | End: 2020-09-30
Payer: MEDICARE

## 2020-09-30 PROCEDURE — 97140 MANUAL THERAPY 1/> REGIONS: CPT | Performed by: PHYSICAL THERAPIST

## 2020-09-30 PROCEDURE — G0283 ELEC STIM OTHER THAN WOUND: HCPCS | Performed by: PHYSICAL THERAPIST

## 2020-09-30 PROCEDURE — 97110 THERAPEUTIC EXERCISES: CPT | Performed by: PHYSICAL THERAPIST

## 2020-09-30 PROCEDURE — 97016 VASOPNEUMATIC DEVICE THERAPY: CPT | Performed by: PHYSICAL THERAPIST

## 2020-09-30 NOTE — FLOWSHEET NOTE
[facility],  [address] [place]  Phone: [phone]  Fax [phone]    Physical Therapy Treatment Note/ Progress Report:           Date:  2020    Patient Name:  Eleonora Jeff    :  1950  MRN: [id number]    Medical/Treatment Diagnosis Information:  · Diagnosis: L knee M23.42 (ICD-10-CM) - Loose body of left knee  · Treatment Diagnosis: L knee pain (M25.652)    L knee stiffness (M25.662)  Difficulty walking (R26.2); s/p L knee LBR/chomdroplasty of PFJ and medial compartment 2/10/68  Insurance/Certification information:  PT Insurance Information: Medicare/Medicaid  Physician Information:  Referring Practitioner: Rosenda Sherman MD    Date of Patient follow up with Physician and OP note due: 10/15/20    Latex Allergy/Pacemaker/Adhesive allergy:  [x]NO      []YES      Is this a Progress Report:     []  Yes  [x]  No      If Yes:  Date Range for reporting period:  Beginnin20  Ending:    Progress report will be due (10 Rx or 30 days ):        Recertification will be due (POC Duration  / 90 days ): 20     Has the plan of care been signed (Y/N):        [x]  Yes  []  No         Visit # Date Range Insurance Allowable Requires auth   [address]ue[address] [facility]N    [x]no        []yes:           SUBJECTIVE:  Pt states he is doing his exercises 3x a day but he doesn't count the reps so doesn't now how much he is doing. States he ices 3 hours a day but only once yesterday. He c/o his knee feeling very unstable. Went to the one crutch out of the house about 3 days. Pain level:  2-7/10 Pain increases with full extension in WB.     OBJECTIVE: See below   Observation: girth (cm) IP:38  MP:40.5 SP:41 Edema visually around the knee appears increased but measures same as Eval.  Pt has sever bruising medial posterior and lateral thigh as well as anterior medial shin. Balottable patella.  Vijay's test (-) and no point tenderness in upper thigh. Forced DF (-)    PT Practice Pattern:D  Co morbidities:3+  SURgical 9/22/20 L knee LBR chondroplasty  INITIAL VISIT 9/24/20 CURRENT VISIT 9/30/20   PAIN 0-10/10 4-6/10 2-7/10   FUNCTIONAL SCALE LEFS (10/80) 88% NT   L knee AROM KE -5      120                           STRENGHT (MMT) Quad tone Fair Fair                                       RESTRICTIONS/PRECAUTIONS: vertigo    Exercises/Interventions:     HEP:Access Code: [email or web address]   Patient Portal: [email or web address]       Therapeutic Ex (34364) Reps/Sets/time Notes/CUES HEP   seated HS stretch  3x30\" reviewed X    Seated calf stretch with strap 3x30\" reviewed X    Seated QS 10x5\"  X    SLRF 10x  X     X    SB rolls for FLX 15x5\"     inlcine stretch LLE 3x20\"           Gait training Single crutch heel toe full KE with WB, to use 2 crutches at home until NV 6'           Pt education x8' Reinforced crutch use in  The house as well, icing, counting reps of therex, bruising          Manual Intervention (08672)      Patellar mobs GI-II 3'     Manual HS stretch 3'     Edema massage 2'                       NMR re-education (97948)  CUES NEEDED                                                          Therapeutic Activity (02637)                                                Therapeutic Exercise and NMR EXR  [x] (83348) Provided verbal/tactile cueing for activities related to strengthening, flexibility, endurance, ROM for improvements in LE, proximal hip, and core control with self care, mobility, lifting, ambulation. [x] (96434) Provided verbal/tactile cueing for activities related to improving balance, coordination, kinesthetic sense, posture, motor skill, proprioception  to assist with LE, proximal hip, and core control in self care, mobility, lifting, ambulation and eccentric single leg control.      NMR and Therapeutic Activities:    [] (57463 or ) Provided verbal/tactile cueing for activities related to improving balance, coordination, kinesthetic sense, posture, motor skill, proprioception and motor activation to allow for proper function of core, proximal hip and LE with self care and ADLs  [] (35631) Gait Re-education- Provided training and instruction to the patient for proper LE, core and proximal hip recruitment and positioning and eccentric body weight control with ambulation re-education including up and down stairs     Home Exercise Program:    [x] (95577) Reviewed/Progressed HEP activities related to strengthening, flexibility, endurance, ROM of core, proximal hip and LE for functional self-care, mobility, lifting and ambulation/stair navigation   [] (73609)Reviewed/Progressed HEP activities related to improving balance, coordination, kinesthetic sense, posture, motor skill, proprioception of core, proximal hip and LE for self care, mobility, lifting, and ambulation/stair navigation      Manual Treatments:  PROM / STM / Oscillations-Mobs:  G-I, II, III, IV (PA's, Inf., Post.)  [x] (30887) Provided manual therapy to mobilize LE, proximal hip and/or LS spine soft tissue/joints for the purpose of modulating pain, promoting relaxation,  increasing ROM, reducing/eliminating soft tissue swelling/inflammation/restriction, improving soft tissue extensibility and allowing for proper ROM for normal function with self care, mobility, lifting and ambulation. Trigger point Dry Needling:  fine needle insertion into myofascial trigger points to stimulate a healing response  [] (92705) Needle insertion without injection: 1 or 2 muscles  [] (27523) Needle insertion without injection: 3 or more muscles    Modalities:     [x] GAME READY (VASO)- for significant edema, swelling, pain control. x15'    [x] ESU (HV/PM) for edema and pain control HV x15'      Charges:  Timed Code Treatment Minutes: 40   Total Treatment Minutes: 55         [] EVAL (LOW) 18739 (typically 20 minutes face-to-face)  [] EVAL (MOD) 79421 (typically 30 minutes face-to-face)  [] EVAL (HIGH) 96198 (typically 45 minutes face-to-face)  [] RE-EVAL     [x] GO(75565) x 2   [] IONTO  [] NMR (20431) x     [x] VASO  [x] Manual (14656) x 1     [] Other:  [] TA x      [] Mech Traction (67219)  [] ES(attended) (35665)      [x] ES (un) (26085):     GOALS:     Patient stated goal: To walk and play golf again. [x]? Progressing: []? Met: []? Not Met: []? Adjusted     Therapist goals for Patient:   Short Term Goals: To be achieved in: 2 weeks  1. Independent in HEP and progression per patient tolerance, in order to prevent re-injury. [x]? Progressing: []? Met: []? Not Met: []? Adjusted  2. Patient will have a decrease in pain to facilitate improvement in movement, function, and ADLs as indicated by Functional Deficits. [x]? Progressing: []? Met: []? Not Met: []? Adjusted     Long Term Goals: To be achieved in: 8 weeks  1. Disability index score of 44% or less for the LEFS to assist with reaching prior level of function. []? Progressing: []? Met: []? Not Met: []? Adjusted  2. Patient will demonstrate increased AROM to -5 to 130 to allow for proper joint functioning as indicated by patients Functional Deficits. []? Progressing: []? Met: []? Not Met: []? Adjusted  3. Patient will demonstrate an increase in Strength to good proximal hip strength and control, 4+/5 in LE to allow for proper functional mobility as indicated by patients Functional Deficits. []? Progressing: []? Met: []? Not Met: []? Adjusted  4. Patient will return to daily yardwork functional activities without increased symptoms or restriction. []? Progressing: []? Met: []? Not Met: []? Adjusted  5. Pt will be able to golf 9 holes without restrictions from knee pain. (patient specific functional goal)    []? Progressing: []? Met: []? Not Met: []? Adjusted              ASSESSMENT:  Pt was ultimately concerned about his excessive bruising in the LE.   Kacey Richard PA-C came and looked at the patient alleviating his worries that it is more than post op bruising coupled with aspirin therapy. The patient is able to demonstrate all HEP with proper form and good control this visit. Pt was also educated on the amount of medial work that was done and that it will cause soreness in this area for a while especially when ambulating with a limp, valgus stress and flexed knee. He was instructed to use both crutches (as before) in order to work on more pain free and proper gait pattern at least until his NV. This should also moderated his edema. It is noteworthy that the patient had previous knee sx in the [employer] and apparently spent months in the hospital due to excessive swelling (per his account). Overall Progression Towards Functional goals/ Treatment Progress Update:  [] Patient is progressing as expected towards functional goals listed. [] Progression is slowed due to complexities/Impairments listed. [] Progression has been slowed due to co-morbidities. [x] Plan just implemented, too soon to assess goals progression <30days   [] Goals require adjustment due to lack of progress  [] Patient is not progressing as expected and requires additional follow up with physician  [] Other    Prognosis for POC: [x] Good [] Fair  [] Poor      Patient requires continued skilled intervention: [x] Yes  [] No    Treatment/Activity Tolerance:  [x] Patient able to complete treatment  [] Patient limited by fatigue  [] Patient limited by pain    [] Patient limited by other medical complications  [] Other:     PLAN: See eval.  Gait training and edema control. [x] Continue per plan of care [] Alter current plan (see comments above)  [] Plan of care initiated [] Hold pending MD visit [] Discharge      Electronically signed by:  Janeen Schwartz, [id number]    Note: If patient does not return for scheduled/ recommended follow up visits, this note will serve as a discharge from care along with most recent update on progress.

## 2020-10-02 ENCOUNTER — HOSPITAL ENCOUNTER (OUTPATIENT)
Dept: PHYSICAL THERAPY | Age: 70
Setting detail: THERAPIES SERIES
Discharge: HOME | End: 2020-10-02
Payer: MEDICARE

## 2020-10-02 PROCEDURE — 97016 VASOPNEUMATIC DEVICE THERAPY: CPT

## 2020-10-02 PROCEDURE — 97112 NEUROMUSCULAR REEDUCATION: CPT

## 2020-10-02 PROCEDURE — 97110 THERAPEUTIC EXERCISES: CPT

## 2020-10-02 NOTE — FLOWSHEET NOTE
[facility],  [address] [place]  Phone: [phone]  Fax [phone]    Physical Therapy Treatment Note/ Progress Report:           Date:  10/2/2020    Patient Name:  Dario Rubin    :  1950  MRN: [id number]    Medical/Treatment Diagnosis Information:  · Diagnosis: L knee M23.42 (ICD-10-CM) - Loose body of left knee  · Treatment Diagnosis: L knee pain (M25.652)    L knee stiffness (M25.662)  Difficulty walking (R26.2); s/p L knee LBR/chomdroplasty of PFJ and medial compartment 3/65/26  Insurance/Certification information:  PT Insurance Information: Medicare/Medicaid  Physician Information:  Referring Practitioner: Estefani Bermudez MD    Date of Patient follow up with Physician and OP note due: 10/15/20    Latex Allergy/Pacemaker/Adhesive allergy:  [x]NO      []YES      Is this a Progress Report:     []  Yes  [x]  No      If Yes:  Date Range for reporting period:  Beginnin20  Ending:    Progress report will be due (10 Rx or 30 days ):        Recertification will be due (POC Duration  / 90 days ): 20     Has the plan of care been signed (Y/N):        [x]  Yes  []  No         Visit # Date Range Insurance Allowable Requires auth   [address] [facility]LE[facility]    [x]no        []yes:           SUBJECTIVE:  Pt states he is doing his exercises 3x a day but he doesn't count the reps so doesn't now how much he is doing. States he ices 3 hours a day but only once yesterday. He c/o his knee feeling very unstable. Went to the one crutch out of the house about 3 days. Pain level:  0/10 Pain increases with full extension in WB.     OBJECTIVE: See below   Observation: girth (cm) IP:38  MP:40.5 SP:41 Edema visually around the knee appears increased but measures same as Eval.  Pt has sever bruising medial posterior and lateral thigh as well as anterior medial shin. Balottable patella.  Vijay's test (-) and no point tenderness in upper thigh. Forced DF (-)    PT Practice Pattern:D  Co morbidities:3+  SURgical 9/22/20 L knee LBR chondroplasty  INITIAL VISIT 9/24/20 CURRENT VISIT 9/30/20   PAIN 0-10/10 4-6/10 2-7/10   FUNCTIONAL SCALE LEFS (10/80) 88% NT   L knee AROM KE -5      120                           STRENGHT (MMT) Quad tone Fair Fair                                       RESTRICTIONS/PRECAUTIONS: vertigo    Exercises/Interventions:     HEP:Access Code: [email or web address]   Patient Portal: [email or web address]/       Therapeutic Ex (92980) Reps/Sets/time Notes/CUES HEP   seated HS stretch  3x30\" reviewed X    Seated calf stretch with strap 3x30\" reviewed X    Seated QS 10x5\"  X    SLRF 10x  X     X    SB rolls for FLX 15x5\"     incline stretch LLE 3x20\"                 Clamshells 2x15     Heel prop KE stretch 3' 5# weight     LAQ 2x15      Standing TKE BTB at table side 3\"Hx10  VC and TC for quad activation and prevention of pelvic compensations           Gait training Single crutch heel toe full KE with WB, to use 2 crutches at home until NV 6'           Pt education x8' Reinforced crutch use in  The house as well, icing, counting reps of therex, bruising          Manual Intervention (77294)      Patellar mobs GI-II 3'     Manual HS stretch 3'                           NMR re-education (13884)  CUES NEEDED    Prone GS + TKE 3\"Hx15      Prone KF + HE 10x      Gait retraining w B crutches  3' Focus on TKE and HE                                        Therapeutic Activity (27959)                                                Therapeutic Exercise and NMR EXR  [x] (38925) Provided verbal/tactile cueing for activities related to strengthening, flexibility, endurance, ROM for improvements in LE, proximal hip, and core control with self care, mobility, lifting, ambulation.   [x] (94735) Provided verbal/tactile cueing for activities related to improving balance, coordination, kinesthetic sense, posture, motor skill, proprioception  to assist with LE, proximal hip, and core control in self care, mobility, lifting, ambulation and eccentric single leg control. NMR and Therapeutic Activities:    [] (90921 or 24149) Provided verbal/tactile cueing for activities related to improving balance, coordination, kinesthetic sense, posture, motor skill, proprioception and motor activation to allow for proper function of core, proximal hip and LE with self care and ADLs  [] (36230) Gait Re-education- Provided training and instruction to the patient for proper LE, core and proximal hip recruitment and positioning and eccentric body weight control with ambulation re-education including up and down stairs     Home Exercise Program:    [x] (87579) Reviewed/Progressed HEP activities related to strengthening, flexibility, endurance, ROM of core, proximal hip and LE for functional self-care, mobility, lifting and ambulation/stair navigation   [] (13144)Reviewed/Progressed HEP activities related to improving balance, coordination, kinesthetic sense, posture, motor skill, proprioception of core, proximal hip and LE for self care, mobility, lifting, and ambulation/stair navigation      Manual Treatments:  PROM / STM / Oscillations-Mobs:  G-I, II, III, IV (PA's, Inf., Post.)  [x] (88618) Provided manual therapy to mobilize LE, proximal hip and/or LS spine soft tissue/joints for the purpose of modulating pain, promoting relaxation,  increasing ROM, reducing/eliminating soft tissue swelling/inflammation/restriction, improving soft tissue extensibility and allowing for proper ROM for normal function with self care, mobility, lifting and ambulation.      Trigger point Dry Needling:  fine needle insertion into myofascial trigger points to stimulate a healing response  [] (76472) Needle insertion without injection: 1 or 2 muscles  [] (41839) Needle insertion without injection: 3 or more muscles    Modalities:     [x] GAME READY (VASO)- for significant edema, swelling, pain control. x15'    [] ESU (HV/PM) for edema and pain control       Charges:  Timed Code Treatment Minutes: 42   Total Treatment Minutes: 57         [] EVAL (LOW) 36429 (typically 20 minutes face-to-face)  [] EVAL (MOD) 08862 (typically 30 minutes face-to-face)  [] EVAL (HIGH) 55076 (typically 45 minutes face-to-face)  [] RE-EVAL     [x] LM(80956) x 2   [] IONTO  [x] NMR (44474) x  1   [x] VASO  [] Manual (02626) x      [] Other:  [] TA x      [] Mech Traction (63855)  [] ES(attended) (04719)      [] ES (un) (18796):     GOALS:     Patient stated goal: To walk and play golf again. [x]? Progressing: []? Met: []? Not Met: []? Adjusted     Therapist goals for Patient:   Short Term Goals: To be achieved in: 2 weeks  1. Independent in HEP and progression per patient tolerance, in order to prevent re-injury. [x]? Progressing: []? Met: []? Not Met: []? Adjusted  2. Patient will have a decrease in pain to facilitate improvement in movement, function, and ADLs as indicated by Functional Deficits. [x]? Progressing: []? Met: []? Not Met: []? Adjusted     Long Term Goals: To be achieved in: 8 weeks  1. Disability index score of 44% or less for the LEFS to assist with reaching prior level of function. []? Progressing: []? Met: []? Not Met: []? Adjusted  2. Patient will demonstrate increased AROM to -5 to 130 to allow for proper joint functioning as indicated by patients Functional Deficits. []? Progressing: []? Met: []? Not Met: []? Adjusted  3. Patient will demonstrate an increase in Strength to good proximal hip strength and control, 4+/5 in LE to allow for proper functional mobility as indicated by patients Functional Deficits. []? Progressing: []? Met: []? Not Met: []? Adjusted  4. Patient will return to daily yardwork functional activities without increased symptoms or restriction. []? Progressing: []? Met: []? Not Met: []? Adjusted  5.  Pt will be able to golf 9 holes without restrictions from knee pain. (patient specific functional goal)    []? Progressing: []? Met: []? Not Met: []? Adjusted              ASSESSMENT:  Pt has cont swelling in his L knee and medial knee pain upon arrival and during HS stretching. Exercises continued to focus on quad and proximal hip strengthening with cont difficulty noted with TKE however he did respond well to TC and VC for improved quad activation in CKC. Pt was provided updated HEP to include additional hip ex. The patient is able to demonstrate all HEP with proper form and good control this visit. Pt was also educated on the amount of medial work that was done and that it will cause soreness in this area for a while especially when ambulating with a limp, valgus stress and flexed knee. He was instructed to use both crutches (as before) in order to work on more pain free and proper gait pattern at least until his NV. This should also moderated his edema. It is noteworthy that the patient had previous knee sx in the [employer] and apparently spent months in the hospital due to excessive swelling (per his account). Overall Progression Towards Functional goals/ Treatment Progress Update:  [] Patient is progressing as expected towards functional goals listed. [] Progression is slowed due to complexities/Impairments listed. [] Progression has been slowed due to co-morbidities.   [x] Plan just implemented, too soon to assess goals progression <30days   [] Goals require adjustment due to lack of progress  [] Patient is not progressing as expected and requires additional follow up with physician  [] Other    Prognosis for POC: [x] Good [] Fair  [] Poor      Patient requires continued skilled intervention: [x] Yes  [] No    Treatment/Activity Tolerance:  [x] Patient able to complete treatment  [] Patient limited by fatigue  [] Patient limited by pain    [] Patient limited by other medical complications  [] Other:     PLAN: See eval.  Gait training and edema control. [x] Continue per plan of care [] Alter current plan (see comments above)  [] Plan of care initiated [] Hold pending MD visit [] Discharge      Electronically signed by:  Christopher Lucero PT , DPT, [facility] [id number]     Note: If patient does not return for scheduled/ recommended follow up visits, this note will serve as a discharge from care along with most recent update on progress.

## 2020-10-05 ENCOUNTER — APPOINTMENT (OUTPATIENT)
Dept: PHYSICAL THERAPY | Age: 70
End: 2020-10-05
Payer: MEDICARE

## 2020-10-07 ENCOUNTER — APPOINTMENT (OUTPATIENT)
Dept: PHYSICAL THERAPY | Age: 70
End: 2020-10-07
Payer: MEDICARE

## 2020-10-09 ENCOUNTER — HOSPITAL ENCOUNTER (OUTPATIENT)
Dept: PHYSICAL THERAPY | Age: 70
Setting detail: THERAPIES SERIES
Discharge: HOME | End: 2020-10-09
Payer: MEDICARE

## 2020-10-09 PROCEDURE — 97110 THERAPEUTIC EXERCISES: CPT

## 2020-10-09 PROCEDURE — 97112 NEUROMUSCULAR REEDUCATION: CPT

## 2020-10-09 NOTE — FLOWSHEET NOTE
[facility],  [address] [place]  Phone: [phone]  Fax [phone]    Physical Therapy Treatment Note/ Progress Report:           Date:  10/9/2020    Patient Name:  Negrito Devries    :  1950  MRN: [id number]    Medical/Treatment Diagnosis Information:  · Diagnosis: L knee M23.42 (ICD-10-CM) - Loose body of left knee  · Treatment Diagnosis: L knee pain (M25.652)    L knee stiffness (M25.662)  Difficulty walking (R26.2); s/p L knee LBR/chomdroplasty of PFJ and medial compartment 47  Insurance/Certification information:  PT Insurance Information: Medicare/Medicaid  Physician Information:  Referring Practitioner: Cindy Gonzales MD    Date of Patient follow up with Physician and OP note due: 10/15/20    Latex Allergy/Pacemaker/Adhesive allergy:  [x]NO      []YES      Is this a Progress Report:     []  Yes  [x]  No      If Yes:  Date Range for reporting period:  Beginnin20  Ending:    Progress report will be due (10 Rx or 30 days ):        Recertification will be due (POC Duration  / 90 days ): 20     Has the plan of care been signed (Y/N):        [x]  Yes  []  No         Visit # Date Range Insurance Allowable Requires auth   4 20 [facility]    [x]no        []yes:           SUBJECTIVE:  Pt states he is continuing to have swelling and pain in WB positions. He is doing his exercises 1-2x/day. He is continuing to have 800 mg Ibuprofen 4xday prescribed to him. Pain level:  3-4/10 Pain increases with full extension in WB.     OBJECTIVE: See below   Observation: girth (cm) IP:38  MP:40.5 SP:41 Edema visually around the knee appears increased but measures same as Eval.  Pt has sever bruising medial posterior and lateral thigh as well as anterior medial shin. Balottable patella. Vijay's test (-) and no point tenderness in upper thigh.  Forced DF (-)    PT Practice Pattern:D  Co morbidities:3+  SURgical 9/22/20 L knee LBR chondroplasty  INITIAL VISIT 9/24/20 CURRENT VISIT 9/30/20   PAIN 0-10/10 4-6/10 2-7/10   FUNCTIONAL SCALE LEFS (10/80) 88% NT   L knee AROM KE -5      120                           STRENGHT (MMT) Quad tone Fair Fair                                       RESTRICTIONS/PRECAUTIONS: vertigo    Exercises/Interventions:     HEP:Access Code: [email or web address]   Patient Portal: [email or web address]/       Therapeutic Ex (86537) Reps/Sets/time Notes/CUES HEP   3x30\" reviewed X    3x30\" reviewed X    10x5\"  X    SLRF 3\"H, 10x2  X     X    15x5\"     incline stretch LLE 3x30\"     Tructsretch HS stretch 30\"x3     SL hip abd + ext/flx over cone 10x      Bridges Abd OVL 2x15     Bridges add 2x15          Heel prop KE stretch 3' 5# weight     LAQ 3'H, 2x15      Standing TKE BTB at table side 3\"Hx10  VC and TC for quad activation and prevention of pelvic compensations     Recumbent bike 5' Warm up and ROM    Gait training Single crutch heel toe full KE with WB, to use 2 crutches at home until NV 6'           Pt education x8' Reinforced crutch use in  The house as well, icing, counting reps of therex, bruising          Manual Intervention (38571)      Patellar mobs GI-II 3'     Manual HS stretch 3'                           NMR re-education (46191)  CUES NEEDED    Prone GS + TKE 3\"Hx15      Prone KF + HE 10x         Prone GS + HS curl 3'Hx15                                   Therapeutic Activity (99358)                                                Therapeutic Exercise and NMR EXR  [x] (73161) Provided verbal/tactile cueing for activities related to strengthening, flexibility, endurance, ROM for improvements in LE, proximal hip, and core control with self care, mobility, lifting, ambulation.   [x] (19854) Provided verbal/tactile cueing for activities related to improving balance, coordination, kinesthetic sense, posture, motor skill, proprioception  to assist with LE, proximal hip, and core control in self care, mobility, lifting, ambulation and eccentric single leg control. NMR and Therapeutic Activities:    [] (88374 or 45583) Provided verbal/tactile cueing for activities related to improving balance, coordination, kinesthetic sense, posture, motor skill, proprioception and motor activation to allow for proper function of core, proximal hip and LE with self care and ADLs  [] (02889) Gait Re-education- Provided training and instruction to the patient for proper LE, core and proximal hip recruitment and positioning and eccentric body weight control with ambulation re-education including up and down stairs     Home Exercise Program:    [x] (48199) Reviewed/Progressed HEP activities related to strengthening, flexibility, endurance, ROM of core, proximal hip and LE for functional self-care, mobility, lifting and ambulation/stair navigation   [] (34929)Reviewed/Progressed HEP activities related to improving balance, coordination, kinesthetic sense, posture, motor skill, proprioception of core, proximal hip and LE for self care, mobility, lifting, and ambulation/stair navigation      Manual Treatments:  PROM / STM / Oscillations-Mobs:  G-I, II, III, IV (PA's, Inf., Post.)  [x] (38716) Provided manual therapy to mobilize LE, proximal hip and/or LS spine soft tissue/joints for the purpose of modulating pain, promoting relaxation,  increasing ROM, reducing/eliminating soft tissue swelling/inflammation/restriction, improving soft tissue extensibility and allowing for proper ROM for normal function with self care, mobility, lifting and ambulation. Trigger point Dry Needling:  fine needle insertion into myofascial trigger points to stimulate a healing response  [] (76629) Needle insertion without injection: 1 or 2 muscles  [] (51830) Needle insertion without injection: 3 or more muscles    Modalities:     [] GAME READY (VASO)- for significant edema, swelling, pain control.     [] ESU (HV/PM) for edema and pain control       Charges:  Timed Code Treatment Minutes: 39'   Total Treatment Minutes: 39'         [] EVAL (LOW) 23469 (typically 20 minutes face-to-face)  [] EVAL (MOD) 66404 (typically 30 minutes face-to-face)  [] EVAL (HIGH) 33930 (typically 45 minutes face-to-face)  [] RE-EVAL     [x] OO(24640) x 2   [] IONTO  [x] NMR (46792) x  1   [x] VASO  [] Manual (26571) x      [] Other:  [] TA x      [] Mech Traction (28225)  [] ES(attended) (53627)      [] ES (un) (54517):     GOALS:     Patient stated goal: To walk and play golf again. [x]? Progressing: []? Met: []? Not Met: []? Adjusted     Therapist goals for Patient:   Short Term Goals: To be achieved in: 2 weeks  1. Independent in HEP and progression per patient tolerance, in order to prevent re-injury. [x]? Progressing: []? Met: []? Not Met: []? Adjusted  2. Patient will have a decrease in pain to facilitate improvement in movement, function, and ADLs as indicated by Functional Deficits. [x]? Progressing: []? Met: []? Not Met: []? Adjusted     Long Term Goals: To be achieved in: 8 weeks  1. Disability index score of 44% or less for the LEFS to assist with reaching prior level of function. []? Progressing: []? Met: []? Not Met: []? Adjusted  2. Patient will demonstrate increased AROM to -5 to 130 to allow for proper joint functioning as indicated by patients Functional Deficits. []? Progressing: []? Met: []? Not Met: []? Adjusted  3. Patient will demonstrate an increase in Strength to good proximal hip strength and control, 4+/5 in LE to allow for proper functional mobility as indicated by patients Functional Deficits. []? Progressing: []? Met: []? Not Met: []? Adjusted  4. Patient will return to daily yardwork functional activities without increased symptoms or restriction. []? Progressing: []? Met: []? Not Met: []? Adjusted  5. Pt will be able to golf 9 holes without restrictions from knee pain. (patient specific functional goal)    []? Progressing: []? Met: []? Not Met: []? Adjusted              ASSESSMENT:  Pt has cont swelling in his L knee and medial knee pain upon arrival into his L ankle. Exercises continued to focus on quad and proximal hip strengthening with cont difficulty in WB positions which limited his tolerance to gait retraining this date. Patient has f/u next Thursday with his surgeon. The patient is able to demonstrate all HEP with proper form and good control this visit. Pt was also educated on the amount of medial work that was done and that it will cause soreness in this area for a while especially when ambulating with a limp, valgus stress and flexed knee. He was instructed to use both crutches (as before) in order to work on more pain free and proper gait pattern at least until his NV. This should also moderated his edema. It is noteworthy that the patient had previous knee sx in the [employer] and apparently spent months in the hospital due to excessive swelling (per his account). Overall Progression Towards Functional goals/ Treatment Progress Update:  [] Patient is progressing as expected towards functional goals listed. [] Progression is slowed due to complexities/Impairments listed. [] Progression has been slowed due to co-morbidities. [x] Plan just implemented, too soon to assess goals progression <30days   [] Goals require adjustment due to lack of progress  [] Patient is not progressing as expected and requires additional follow up with physician  [] Other    Prognosis for POC: [x] Good [] Fair  [] Poor      Patient requires continued skilled intervention: [x] Yes  [] No    Treatment/Activity Tolerance:  [x] Patient able to complete treatment  [] Patient limited by fatigue  [] Patient limited by pain    [] Patient limited by other medical complications  [] Other:     PLAN: See eval.  Gait training and edema control.   [x] Continue per plan of care [] Alter current plan (see comments above)  [] Plan of care initiated [] Hold pending MD visit [] Discharge      Electronically signed by:  Amy Argueta, PT , DPT, OCS [id number]     Note: If patient does not return for scheduled/ recommended follow up visits, this note will serve as a discharge from care along with most recent update on progress.

## 2020-10-12 ENCOUNTER — HOSPITAL ENCOUNTER (OUTPATIENT)
Dept: PHYSICAL THERAPY | Age: 70
Setting detail: THERAPIES SERIES
Discharge: HOME | End: 2020-10-12
Payer: MEDICARE

## 2020-10-12 PROCEDURE — 97112 NEUROMUSCULAR REEDUCATION: CPT | Performed by: PHYSICAL THERAPIST

## 2020-10-12 PROCEDURE — 97110 THERAPEUTIC EXERCISES: CPT | Performed by: PHYSICAL THERAPIST

## 2020-10-12 PROCEDURE — 97140 MANUAL THERAPY 1/> REGIONS: CPT | Performed by: PHYSICAL THERAPIST

## 2020-10-12 NOTE — FLOWSHEET NOTE
[facility],  [address] [place]  Phone: [phone]  Fax [phone]    Physical Therapy Treatment Note/ Progress Report:           Date:  10/12/2020    Patient Name:  James Arthur    :  1950  MRN: [id number]    Medical/Treatment Diagnosis Information:  · Diagnosis: L knee M23.42 (ICD-10-CM) - Loose body of left knee  · Treatment Diagnosis: L knee pain (M25.652)    L knee stiffness (M25.662)  Difficulty walking (R26.2); s/p L knee LBR/chomdroplasty of PFJ and medial compartment   Insurance/Certification information:  PT Insurance Information: Medicare/Medicaid  Physician Information:  Referring Practitioner: Violeta Marmolejo MD    Date of Patient follow up with Physician and OP note due: 10/15/20    Latex Allergy/Pacemaker/Adhesive allergy:  [x]NO      []YES      Is this a Progress Report:     []  Yes  [x]  No      If Yes:  Date Range for reporting period:  Beginnin20  Ending:    Progress report will be due (10 Rx or 30 days ):        Recertification will be due (POC Duration  / 90 days ): 20     Has the plan of care been signed (Y/N):        [x]  Yes  []  No         Visit # Date Range Insurance Allowable Requires auth   5 20 [address] [facility]    [x]no        []yes:           SUBJECTIVE:  Pt complains of severe pain doing any of his exercises and he has to stop because it hurts too bad. He reports pain with full extension and WB. Pain level:  3-5/10 Pain increases with full extension in WB.     OBJECTIVE: See below   Observation: girth (cm) IP:38  MP:40.5 SP:41 Edema visually around the knee appears increased but measures same as Eval.  Pt has sever bruising medial posterior and lateral thigh as well as anterior medial shin. Balottable patella. Vijay's test (-) and no point tenderness in upper thigh.  Forced DF (-)    PT Practice Pattern:D  Co morbidities:3+  SURgical 20 L knee LBR chondroplasty  INITIAL VISIT 9/24/20 CURRENT VISIT 10/12/20   PAIN 0-10/10 4-6/10 3-5/10   FUNCTIONAL SCALE LEFS (10/80) 88% NT   L knee AROM KE -5      118                           STRENGHT (MMT) Quad tone Fair fair                                       RESTRICTIONS/PRECAUTIONS: vertigo    Exercises/Interventions:     HEP:Access Code: [email or web address]   Patient Portal: [email or web address]/       Therapeutic Ex (60926) Reps/Sets/time Notes/CUES HEP   3x30\" reviewed X    3x30\" reviewed X    10x5\"  X    SLRF 3\"H, 10x2  X     X    15x5\"     incline stretch LLE 3x30\"     Tructsretch HS stretch 30\"x3                     Heel prop KE stretch 3' 5# weight         VC and TC for quad activation and prevention of pelvic compensations     Recumbent bike 5' Warm up and ROM              Pt education x8' Reinforced crutch use in  The house as well, icing, counting reps of therex, bruising          Manual Intervention (75965)      Patellar mobs GI-II 3'     Manual HS stretch 3'         ITB roller, HS and quad massage for flexibility 5'     K tape for patella with NMES 2'           NMR re-education (61202)  CUES NEEDED                   NMES (Biphasic ) QS 10' 10\"/10\"                            Therapeutic Activity (33926)                                                Therapeutic Exercise and NMR EXR  [x] (41403) Provided verbal/tactile cueing for activities related to strengthening, flexibility, endurance, ROM for improvements in LE, proximal hip, and core control with self care, mobility, lifting, ambulation. [x] (99752) Provided verbal/tactile cueing for activities related to improving balance, coordination, kinesthetic sense, posture, motor skill, proprioception  to assist with LE, proximal hip, and core control in self care, mobility, lifting, ambulation and eccentric single leg control.      NMR and Therapeutic Activities:    [x] (16700 or 67501) Provided verbal/tactile cueing for activities related to improving balance, coordination, kinesthetic sense, posture, motor skill, proprioception and motor activation to allow for proper function of core, proximal hip and LE with self care and ADLs  [] (52807) Gait Re-education- Provided training and instruction to the patient for proper LE, core and proximal hip recruitment and positioning and eccentric body weight control with ambulation re-education including up and down stairs     Home Exercise Program:    [x] (92835) Reviewed/Progressed HEP activities related to strengthening, flexibility, endurance, ROM of core, proximal hip and LE for functional self-care, mobility, lifting and ambulation/stair navigation   [] (74862)Reviewed/Progressed HEP activities related to improving balance, coordination, kinesthetic sense, posture, motor skill, proprioception of core, proximal hip and LE for self care, mobility, lifting, and ambulation/stair navigation      Manual Treatments:  PROM / STM / Oscillations-Mobs:  G-I, II, III, IV (PA's, Inf., Post.)  [x] (68432) Provided manual therapy to mobilize LE, proximal hip and/or LS spine soft tissue/joints for the purpose of modulating pain, promoting relaxation,  increasing ROM, reducing/eliminating soft tissue swelling/inflammation/restriction, improving soft tissue extensibility and allowing for proper ROM for normal function with self care, mobility, lifting and ambulation. Trigger point Dry Needling:  fine needle insertion into myofascial trigger points to stimulate a healing response  [] (35973) Needle insertion without injection: 1 or 2 muscles  [] (87763) Needle insertion without injection: 3 or more muscles    Modalities:  Ice at home this visit   [] GAME READY (VASO)- for significant edema, swelling, pain control. [] ESU (HV/PM) for edema and pain control       Charges:  Timed Code Treatment Minutes:     Total Treatment Minutes:          [] EVAL (LOW) 78578 (typically 20 minutes face-to-face)  [] EVAL (MOD) 79106 (typically 30 minutes face-to-face)  [] EVAL (HIGH) 46518 (typically 45 minutes face-to-face)  [] RE-EVAL     [x] EM(98161) x 1   [] IONTO  [x] NMR (13344) x  1   [] VASO  [x] Manual (35295) x  1    [] Other:  [] TA x      [] Mech Traction (32371)  [] ES(attended) (36983)      [] ES (un) (99926):     GOALS:     Patient stated goal: To walk and play golf again. [x]? Progressing: []? Met: []? Not Met: []? Adjusted     Therapist goals for Patient:   Short Term Goals: To be achieved in: 2 weeks  1. Independent in HEP and progression per patient tolerance, in order to prevent re-injury. [x]? Progressing: []? Met: []? Not Met: []? Adjusted  2. Patient will have a decrease in pain to facilitate improvement in movement, function, and ADLs as indicated by Functional Deficits. [x]? Progressing: []? Met: []? Not Met: []? Adjusted     Long Term Goals: To be achieved in: 8 weeks  1. Disability index score of 44% or less for the LEFS to assist with reaching prior level of function. []? Progressing: []? Met: []? Not Met: []? Adjusted  2. Patient will demonstrate increased AROM to -5 to 130 to allow for proper joint functioning as indicated by patients Functional Deficits. []? Progressing: []? Met: []? Not Met: []? Adjusted  3. Patient will demonstrate an increase in Strength to good proximal hip strength and control, 4+/5 in LE to allow for proper functional mobility as indicated by patients Functional Deficits. []? Progressing: []? Met: []? Not Met: []? Adjusted  4. Patient will return to daily yardwork functional activities without increased symptoms or restriction. []? Progressing: []? Met: []? Not Met: []? Adjusted  5. Pt will be able to golf 9 holes without restrictions from knee pain. (patient specific functional goal)    []? Progressing: []? Met: []? Not Met: []? Adjusted              ASSESSMENT:  Pt has cont swelling (overall decreased) in his L knee and medial knee pain upon arrival into his L ankle.  Exercises continued to focus on quad and proximal hip strengthening with cont difficulty in WB positions which limited his tolerance to gait retraining this date. Patient has f/u next Thursday with his surgeon. Pt continues to not utilize his AD as directed by PT since sx. He is ambulating and WB with a flexed knee causing increased edema, patellar irritation and pain as well as tightness into KE and slow quad activation. He has been instructed to use at least 1 crutch all the time even at home to un-weight his knee. With is hx of serious post op edema issues and a medial compartment chondroplasty this would have decreased medial irritation and pain that he describes continuing. Overall Progression Towards Functional goals/ Treatment Progress Update:  [] Patient is progressing as expected towards functional goals listed. [x] Progression is slowed due to complexities/Impairments listed. [] Progression has been slowed due to co-morbidities. [] Plan just implemented, too soon to assess goals progression <30days   [] Goals require adjustment due to lack of progress  [] Patient is not progressing as expected and requires additional follow up with physician  [] Other    Prognosis for POC: [x] Good [] Fair  [] Poor      Patient requires continued skilled intervention: [x] Yes  [] No    Treatment/Activity Tolerance:  [x] Patient able to complete treatment  [] Patient limited by fatigue  [] Patient limited by pain    [] Patient limited by other medical complications  [] Other:     PLAN: Cont to work on edema control and quad re ed as well as KE for proper gait and decreased pain. Assess effect of NMES.  PN NV  [x] Continue per plan of care [] Alter current plan (see comments above)  [] Plan of care initiated [] Hold pending MD visit [] Discharge      Electronically signed by:  Syd Maddox PT , [id number]    Note: If patient does not return for scheduled/ recommended follow up visits, this note will serve as a discharge from care along with most recent update on progress.

## 2020-10-14 ENCOUNTER — HOSPITAL ENCOUNTER (OUTPATIENT)
Dept: PHYSICAL THERAPY | Age: 70
Setting detail: THERAPIES SERIES
Discharge: HOME | End: 2020-10-14
Payer: MEDICARE

## 2020-10-14 PROCEDURE — 97112 NEUROMUSCULAR REEDUCATION: CPT | Performed by: PHYSICAL THERAPIST

## 2020-10-14 PROCEDURE — 97110 THERAPEUTIC EXERCISES: CPT | Performed by: PHYSICAL THERAPIST

## 2020-10-14 PROCEDURE — 97140 MANUAL THERAPY 1/> REGIONS: CPT | Performed by: PHYSICAL THERAPIST

## 2020-10-14 PROCEDURE — G0283 ELEC STIM OTHER THAN WOUND: HCPCS | Performed by: PHYSICAL THERAPIST

## 2020-10-14 NOTE — PROGRESS NOTES
[facility],  [address] [place]  Phone: [phone]  Fax [phone]    Physical Therapy Treatment Note/Progress note:           Date:  10/14/2020    Patient Name:  Veronica Deluca    :  1950  MRN: [id number]    Medical/Treatment Diagnosis Information:  · Diagnosis: L knee M23.42 (ICD-10-CM) - Loose body of left knee  · Treatment Diagnosis: L knee pain (M25.652)    L knee stiffness (M25.662)  Difficulty walking (R26.2); s/p L knee LBR/chomdroplasty of PFJ and medial compartment   Insurance/Certification information:  PT Insurance Information: Medicare/Medicaid  Physician Information:  Referring Practitioner: Jackee Romberg MD    Date of Patient follow up with Physician and OP note due: 10/15/20    Latex Allergy/Pacemaker/Adhesive allergy:  [x]NO      []YES      Is this a Progress Report:     [x]  Yes And MD update []  No      If Yes:  Date Range for reporting period:  Beginnin20  Ending: 10/14/14    Progress report will be due (10 Rx or 30 days ):    10/24/20 (done 10/14/20 with OP note/MD update)   20 or visit 16      Recertification will be due (POC Duration  / 90 days ): 20     Has the plan of care been signed (Y/N):        [x]  Yes  []  No         Visit # Date Range Insurance Allowable Requires auth   6 20-10/14/20 Manuel GARCIA    [x]no        []yes:           SUBJECTIVE:  Pt describes a lot less acute pain in the knee since last visit. More aching. Pain level:  3/10 Pain increases with full extension in WB.     OBJECTIVE: See below   Observation: girth (cm) IP:38  MP:40.5 SP:39.5    Myofascial restrictions in the HS and ITB.     PT Practice Pattern:D  Co morbidities:3+  SURgical 20 L knee LBR chondroplasty  INITIAL VISIT 20 CURRENT VISIT 10/14/20   PAIN 0-10/10 4-6/10 3/10   FUNCTIONAL SCALE LEFS (10/80) 88% (37/80) 54%   L knee AROM KE -5 -5     118 STRENGHT (MMT) Quad tone Fair Fair +    KE NT 4+/5    KF NT 4/5    HF NT 4+/5                       GOALS:     Patient stated goal: To walk and play golf again. [x]? Progressing: []? Met: []? Not Met: []? Adjusted     Therapist goals for Patient:   Short Term Goals: To be achieved in: 2 weeks  1. Independent in HEP and progression per patient tolerance, in order to prevent re-injury. [x]? Progressing: []? Met: []? Not Met: []? Adjusted  2. Patient will have a decrease in pain to facilitate improvement in movement, function, and ADLs as indicated by Functional Deficits. [x]? Progressing: []? Met: []? Not Met: []? Adjusted     Long Term Goals: To be achieved in: 8 weeks  1. Disability index score of 44% or less for the LEFS to assist with reaching prior level of function. [x]? Progressing: []? Met: []? Not Met: []? Adjusted  2. Patient will demonstrate increased AROM to -5 to 130 to allow for proper joint functioning as indicated by patients Functional Deficits. [x]? Progressing: []? Met: []? Not Met: []? Adjusted  3. Patient will demonstrate an increase in Strength to good proximal hip strength and control, 4+/5 in LE to allow for proper functional mobility as indicated by patients Functional Deficits. [x]? Progressing: []? Met: []? Not Met: []? Adjusted  4. Patient will return to daily yardwork functional activities without increased symptoms or restriction. [x]? Progressing: []? Met: []? Not Met: []? Adjusted  5. Pt will be able to golf 9 holes without restrictions from knee pain. (patient specific functional goal)    []? Progressing: []? Met: []? Not Met: []? Adjusted              ASSESSMENT:      (last visit) Pt continues to not utilize his AD as directed by PT since sx. He is ambulating and WB with a flexed knee causing increased edema, patellar irritation and pain as well as tightness into KE and slow quad activation.   He has been instructed to use at least 1 crutch all the time even at home to un-weight his knee. With is hx of serious post op edema issues and a medial compartment chondroplasty this would have decreased medial irritation and pain that he describes continuing. This visit patient showed some improvement pain, swelling, and quad contraction. He is using his crutch all the time at home to [facility] his medial compartment. I am not concerned about his gait progression being slow from using the crutch but more concerned with proper gait pattern and progression with decreased edema, pain and increased quad control at this time. He has improved quite a bit this visit from last. He is currently progressing toward his set goals and has 4 weeks left on his original POC. Overall Progression Towards Functional goals/ Treatment Progress Update:  [] Patient is progressing as expected towards functional goals listed. [x] Progression is slowed due to complexities/Impairments listed. [] Progression has been slowed due to co-morbidities. [] Plan just implemented, too soon to assess goals progression <30days   [] Goals require adjustment due to lack of progress  [] Patient is not progressing as expected and requires additional follow up with physician  [] Other    Prognosis for POC: [x] Good [] Fair  [] Poor      Patient requires continued skilled intervention: [x] Yes  [] No    Treatment/Activity Tolerance:  [x] Patient able to complete treatment  [] Patient limited by fatigue  [] Patient limited by pain    [] Patient limited by other medical complications  [] Other:     PLAN: Cont 2x week for 4 weeks to work on edema control and quad re ed as well as KE for proper gait and decreased pain.    [x] Continue per plan of care [] Alter current plan (see comments above)  [] Plan of care initiated [] Hold pending MD visit [] Discharge      Electronically signed by:  oPpeye Tee PT , [id number]    Note: If patient does not return for scheduled/ recommended follow up visits, this note will serve as a

## 2020-10-14 NOTE — OP NOTE
[facility], [address] [place]  Phone: [phone]  Fax [phone]    Physical Therapy Treatment Note/MD update:           Date:  10/14/2020    Patient Name:  Ravi Holden    :  1950  MRN: [id number]    Medical/Treatment Diagnosis Information:  · Diagnosis: L knee M23.42 (ICD-10-CM) - Loose body of left knee  · Treatment Diagnosis: L knee pain (M25.652)    L knee stiffness (M25.662)  Difficulty walking (R26.2); s/p L knee LBR/chomdroplasty of PFJ and medial compartment   Insurance/Certification information:  PT Insurance Information: Medicare/Medicaid  Physician Information:  Referring Practitioner: Alexandr Tony MD    Date of Patient follow up with Physician and OP note due: 10/15/20    Latex Allergy/Pacemaker/Adhesive allergy:  [x]NO      []YES      Is this a Progress Report:     [x]  Yes And MD update []  No      If Yes:  Date Range for reporting period:  Beginnin20  Ending: 10/14/14    Progress report will be due (10 Rx or 30 days ):    10/24/20 (done 10/14/20 with OP note/MD update)   20 or visit 16      Recertification will be due (POC Duration  / 90 days ): 20     Has the plan of care been signed (Y/N):        [x]  Yes  []  No         Visit # Date Range Insurance Allowable Requires auth   6 20-10/14/20 Manuel GARCIA    [x]no        []yes:           SUBJECTIVE:  Pt describes a lot less acute pain in the knee since last visit. More aching. Pain level:  3/10 Pain increases with full extension in WB.     OBJECTIVE: See below   Observation: girth (cm) IP:38  MP:40.5 SP:39.5    Myofascial restrictions in the HS and ITB.     PT Practice Pattern:D  Co morbidities:3+  SURgical 20 L knee LBR chondroplasty  INITIAL VISIT 20 CURRENT VISIT 10/14/20   PAIN 0-10/10 4-6/10 3/10   FUNCTIONAL SCALE LEFS (10/80) 88% (37/80) 54%   L knee AROM KE -5 -5     118 STRENGHT (MMT) Quad tone Fair Fair +    KE NT 4+/5    KF NT 4/5    HF NT 4+/5                       GOALS:     Patient stated goal: To walk and play golf again. [x]? Progressing: []? Met: []? Not Met: []? Adjusted     Therapist goals for Patient:   Short Term Goals: To be achieved in: 2 weeks  1. Independent in HEP and progression per patient tolerance, in order to prevent re-injury. [x]? Progressing: []? Met: []? Not Met: []? Adjusted  2. Patient will have a decrease in pain to facilitate improvement in movement, function, and ADLs as indicated by Functional Deficits. [x]? Progressing: []? Met: []? Not Met: []? Adjusted     Long Term Goals: To be achieved in: 8 weeks  1. Disability index score of 44% or less for the LEFS to assist with reaching prior level of function. [x]? Progressing: []? Met: []? Not Met: []? Adjusted  2. Patient will demonstrate increased AROM to -5 to 130 to allow for proper joint functioning as indicated by patients Functional Deficits. [x]? Progressing: []? Met: []? Not Met: []? Adjusted  3. Patient will demonstrate an increase in Strength to good proximal hip strength and control, 4+/5 in LE to allow for proper functional mobility as indicated by patients Functional Deficits. [x]? Progressing: []? Met: []? Not Met: []? Adjusted  4. Patient will return to daily yardwork functional activities without increased symptoms or restriction. [x]? Progressing: []? Met: []? Not Met: []? Adjusted  5. Pt will be able to golf 9 holes without restrictions from knee pain. (patient specific functional goal)    []? Progressing: []? Met: []? Not Met: []? Adjusted              ASSESSMENT:      (last visit) Pt continues to not utilize his AD as directed by PT since sx. He is ambulating and WB with a flexed knee causing increased edema, patellar irritation and pain as well as tightness into KE and slow quad activation.   He has been instructed to use at least 1 crutch all the time even at home to un-weight his knee. With is hx of serious post op edema issues and a medial compartment chondroplasty this would have decreased medial irritation and pain that he describes continuing. This visit patient showed some improvement pain, swelling, and quad contraction. He is using his crutch all the time at home to [facility] his medial compartment. I am not concerned about his gait progression being slow from using the crutch but more concerned with proper gait pattern and progression with decreased edema, pain and increased quad control at this time. He has improved quite a bit this visit from last. He is currently progressing toward his set goals and has 4 weeks left on his original POC. Overall Progression Towards Functional goals/ Treatment Progress Update:  [] Patient is progressing as expected towards functional goals listed. [x] Progression is slowed due to complexities/Impairments listed. [] Progression has been slowed due to co-morbidities. [] Plan just implemented, too soon to assess goals progression <30days   [] Goals require adjustment due to lack of progress  [] Patient is not progressing as expected and requires additional follow up with physician  [] Other    Prognosis for POC: [x] Good [] Fair  [] Poor      Patient requires continued skilled intervention: [x] Yes  [] No    Treatment/Activity Tolerance:  [x] Patient able to complete treatment  [] Patient limited by fatigue  [] Patient limited by pain    [] Patient limited by other medical complications  [] Other:     PLAN: Cont 2x week for 4 weeks to work on edema control and quad re ed as well as KE for proper gait and decreased pain.    [x] Continue per plan of care [] Alter current plan (see comments above)  [] Plan of care initiated [] Hold pending MD visit [] Discharge      Electronically signed by:  Claudia Rueda, PT , [id number]    Note: If patient does not return for scheduled/ recommended follow up visits, this note will serve as a discharge from care along with most recent update on progress.

## 2020-10-14 NOTE — FLOWSHEET NOTE
[facility],  [address]  Phone: [phone]  Fax [phone]    Physical Therapy Treatment Note/ Progress Report:           Date:  10/14/2020    Patient Name:  Iam Naranjo    :  1950  MRN: [id number]    Medical/Treatment Diagnosis Information:  · Diagnosis: L knee M23.42 (ICD-10-CM) - Loose body of left knee  · Treatment Diagnosis: L knee pain (M25.652)    L knee stiffness (M25.662)  Difficulty walking (R26.2); s/p L knee LBR/chomdroplasty of PFJ and medial compartment   Insurance/Certification information:  PT Insurance Information: Medicare/Medicaid  Physician Information:  Referring Practitioner: Rashawn Amaya MD    Date of Patient follow up with Physician and OP note due: 10/15/20    Latex Allergy/Pacemaker/Adhesive allergy:  [x]NO      []YES      Is this a Progress Report:     [x]  Yes And MD update []  No      If Yes:  Date Range for reporting period:  Beginnin20  Ending: 10/14/14    Progress report will be due (10 Rx or 30 days ):    10/24/20 (done 10/14/20 with OP note/MD update)   20 or visit 16      Recertification will be due (POC Duration  / 90 days ): 20     Has the plan of care been signed (Y/N):        [x]  Yes  []  No         Visit # Date Range Insurance Allowable Requires auth   6 20-10/14/20 [facility]    [x]no        []yes:           SUBJECTIVE:  Pt describes a lot less acute pain in the knee since last visit. More aching. Pain level:  3/10 Pain increases with full extension in WB.     OBJECTIVE: See below   Observation: girth (cm) IP:38  MP:40.5 SP:39.5    Myofascial restrictions in the HS and ITB.     PT Practice Pattern:D  Co morbidities:3+  SURgical 20 L knee LBR chondroplasty  INITIAL VISIT 20 CURRENT VISIT 10/14/20   PAIN 0-10/10 4-6/10 3/10   FUNCTIONAL SCALE LEFS (10/80) 88% (37/80) 54%   L knee AROM KE -5 -5     118 STRENGHT (MMT) Quad tone Fair Fair +    KE NT 4+/5    KF NT 4/5    HF NT 4+/5                     RESTRICTIONS/PRECAUTIONS: vertigo    Exercises/Interventions:     HEP:Access Code: [email or web address]   Patient Portal: [employer]       Therapeutic Ex (89573) Reps/Sets/time Notes/CUES HEP   3x30\" reviewed X    3x30\" reviewed X    10x5\"  X    SLRF See NMES  X     X    15x5\"     incline stretch LLE 3x30\"     Trustretch HS stretch IR/ER/N 3x15\" ea                     Heel prop KE stretch 2x1' with 1' rest 5# weight         VC and TC for quad activation and prevention of pelvic compensations     [facility]      Recumbent bike 5' Warm up and ROM              Pt education x5' Reinforced crutch use in  The house as well, icing, counting reps of therex, bruising, full KE with gait    Reassessment and progress toward goals  x6'     Manual Intervention (43031)      Patellar mobs GI-II, hip oscillations 3'     Manual prone quad stretch 3'         ITB roller, HS and quad massage for flexibility IASTM 5'     K tape for patella with NMES 2'           NMR re-education (40844)  CUES NEEDED                   NMES (Biphasic ) QS 8/SLR 8' 10\"/10\"  2.0 ramp                            Therapeutic Activity (33724)                                                Therapeutic Exercise and NMR EXR  [x] (23402) Provided verbal/tactile cueing for activities related to strengthening, flexibility, endurance, ROM for improvements in LE, proximal hip, and core control with self care, mobility, lifting, ambulation. [x] (29102) Provided verbal/tactile cueing for activities related to improving balance, coordination, kinesthetic sense, posture, motor skill, proprioception  to assist with LE, proximal hip, and core control in self care, mobility, lifting, ambulation and eccentric single leg control.      NMR and Therapeutic Activities:    [x] (90879 or 66651) Provided verbal/tactile cueing for activities related to improving balance, coordination, kinesthetic sense, posture, motor skill, proprioception and motor activation to allow for proper function of core, proximal hip and LE with self care and ADLs  [] (48039) Gait Re-education- Provided training and instruction to the patient for proper LE, core and proximal hip recruitment and positioning and eccentric body weight control with ambulation re-education including up and down stairs     Home Exercise Program:    [x] (02228) Reviewed/Progressed HEP activities related to strengthening, flexibility, endurance, ROM of core, proximal hip and LE for functional self-care, mobility, lifting and ambulation/stair navigation   [] (89627)Reviewed/Progressed HEP activities related to improving balance, coordination, kinesthetic sense, posture, motor skill, proprioception of core, proximal hip and LE for self care, mobility, lifting, and ambulation/stair navigation      Manual Treatments:  PROM / STM / Oscillations-Mobs:  G-I, II, III, IV (PA's, Inf., Post.)  [x] (16939) Provided manual therapy to mobilize LE, proximal hip and/or LS spine soft tissue/joints for the purpose of modulating pain, promoting relaxation,  increasing ROM, reducing/eliminating soft tissue swelling/inflammation/restriction, improving soft tissue extensibility and allowing for proper ROM for normal function with self care, mobility, lifting and ambulation. Trigger point Dry Needling:  fine needle insertion into myofascial trigger points to stimulate a healing response  [] (45522) Needle insertion without injection: 1 or 2 muscles  [] (67049) Needle insertion without injection: 3 or more muscles    Modalities:  Ice at home this visit   [] GAME READY (VASO)- for significant edema, swelling, pain control.     [] ESU (HV/PM) for edema and pain control       Charges:  Timed Code Treatment Minutes: 50   Total Treatment Minutes: 50         [] EVAL (LOW) 37969 (typically 20 minutes face-to-face)  [] EVAL (MOD) 58034 (typically 30 minutes face-to-face)  [] EVAL (HIGH) 23179 (typically 45 minutes face-to-face)  [] RE-EVAL     [x] PH(44368) x 1   [] IONTO  [x] NMR (83882) x  1   [] VASO  [x] Manual (49887) x  1    [] Other:  [] TA x      [] Mech Traction (60553)  [] ES(attended) (52490)      [x] ES (un) (82718): Biphasic NMES    GOALS:     Patient stated goal: To walk and play golf again. [x]? Progressing: []? Met: []? Not Met: []? Adjusted     Therapist goals for Patient:   Short Term Goals: To be achieved in: 2 weeks  1. Independent in HEP and progression per patient tolerance, in order to prevent re-injury. [x]? Progressing: []? Met: []? Not Met: []? Adjusted  2. Patient will have a decrease in pain to facilitate improvement in movement, function, and ADLs as indicated by Functional Deficits. [x]? Progressing: []? Met: []? Not Met: []? Adjusted     Long Term Goals: To be achieved in: 8 weeks  1. Disability index score of 44% or less for the LEFS to assist with reaching prior level of function. [x]? Progressing: []? Met: []? Not Met: []? Adjusted  2. Patient will demonstrate increased AROM to -5 to 130 to allow for proper joint functioning as indicated by patients Functional Deficits. [x]? Progressing: []? Met: []? Not Met: []? Adjusted  3. Patient will demonstrate an increase in Strength to good proximal hip strength and control, 4+/5 in LE to allow for proper functional mobility as indicated by patients Functional Deficits. [x]? Progressing: []? Met: []? Not Met: []? Adjusted  4. Patient will return to daily yardwork functional activities without increased symptoms or restriction. [x]? Progressing: []? Met: []? Not Met: []? Adjusted  5. Pt will be able to golf 9 holes without restrictions from knee pain. (patient specific functional goal)    []? Progressing: []? Met: []? Not Met: []? Adjusted              ASSESSMENT:      (last visit) Pt continues to not utilize his AD as directed by PT since sx.   He is ambulating and WB with a flexed MD visit [] Discharge      Electronically signed by:  Syd Maddox, PT , [id number]    Note: If patient does not return for scheduled/ recommended follow up visits, this note will serve as a discharge from care along with most recent update on progress.

## 2020-10-15 ENCOUNTER — OFFICE VISIT (OUTPATIENT)
Dept: ORTHOPEDIC SURGERY | Age: 70
End: 2020-10-15

## 2020-10-15 VITALS — BODY MASS INDEX: 23.7 KG/M2 | HEIGHT: 72 IN | WEIGHT: 175 LBS

## 2020-10-15 PROCEDURE — 99024 POSTOP FOLLOW-UP VISIT: CPT | Performed by: ORTHOPAEDIC SURGERY

## 2020-10-15 NOTE — PROGRESS NOTES
ADMIT DATE: 09/22/2020  PROVIDER:     Rashawn Amaya MD     DATE OF PROCEDURE:  09/22/2020     SERVICE:  Orthopedic Surgery.     SURGEON:  Nehal Mendiola MD     FIRST ASSISTANT:  Henry Doherty SA     PREOPERATIVE DIAGNOSIS:  Osteoarthritis of the left knee with multiple  loose bodies and chronic synovitis.     POSTOPERATIVE DIAGNOSES:  1.  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 2.  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 3.  Multiple loose bodies approximately eight removed and several  smaller ones removed with the 4.5 incisor resector. 4.  Chronic synovitis.     OPERATION PERFORMED:  1. Exam under anesthesia and video arthroscopy, left knee. 2.  Chondroplasty of the medial compartment. 3.  Chondroplasty of the patellofemoral compartment. 4.  Removal of multiple loose bodies, all three compartments medial,  lateral, and patellofemoral.  5.  Synovectomy. Returns after the aforementioned surgery. He is a manual explained very specifically to him that he had very bad arthritis but new symptoms potentially consistent with some loose body inflammation. We remove the loose bodies but could not reverse his arthritis of course. Had a lengthy discussion with him again regarding all these findings. Overall, he is doing okay. He is still on 1 crutch and working in physical therapy. No signs of infection or DVT. 1+ effusion. Range of motion is 2 to 120 degrees. Essman/plan: He is good to go ahead and finish up with physical therapy. We did give him a copy of his operative photography. Follow-up in 4 weeks as needed. He might be a candidate for cortisone or Visco if his symptoms persist.  He also understands ultimately he is going need a knee replacement. [facility] explained to him that his arthritis is been there for prolonged period of time and gradually worsening.   The decision to arthroscopy was because he describes such an acute change in his symptoms felt

## 2020-10-19 ENCOUNTER — HOSPITAL ENCOUNTER (OUTPATIENT)
Dept: PHYSICAL THERAPY | Age: 70
Setting detail: THERAPIES SERIES
Discharge: HOME | End: 2020-10-19
Payer: MEDICARE

## 2020-10-19 ENCOUNTER — APPOINTMENT (OUTPATIENT)
Dept: PHYSICAL THERAPY | Age: 70
End: 2020-10-19
Payer: MEDICARE

## 2020-10-19 PROCEDURE — 97110 THERAPEUTIC EXERCISES: CPT | Performed by: PHYSICAL THERAPIST

## 2020-10-19 PROCEDURE — 97140 MANUAL THERAPY 1/> REGIONS: CPT | Performed by: PHYSICAL THERAPIST

## 2020-10-19 PROCEDURE — 97112 NEUROMUSCULAR REEDUCATION: CPT | Performed by: PHYSICAL THERAPIST

## 2020-10-19 NOTE — FLOWSHEET NOTE
[facility],  [address]  Phone: [phone]  Fax [phone]    Physical Therapy Treatment Note/ Progress Report:           Date:  10/19/2020    Patient Name:  Iam Naranjo    :  1950  MRN: [id number]    Medical/Treatment Diagnosis Information:  · Diagnosis: L knee M23.42 (ICD-10-CM) - Loose body of left knee  · Treatment Diagnosis: L knee pain (M25.652)    L knee stiffness (M25.662)  Difficulty walking (R26.2); s/p L knee LBR/chomdroplasty of PFJ and medial compartment   Insurance/Certification information:  PT Insurance Information: Medicare/Medicaid  Physician Information:  Referring Practitioner: Rashawn Amaya MD    Date of Patient follow up with Physician and OP note due: 10/15/20    Latex Allergy/Pacemaker/Adhesive allergy:  [x]NO      []YES      Is this a Progress Report:     []  Yes  [x]  No      If Yes:  Date Range for reporting period:  Beginnin20  Ending: 10/14/14    Progress report will be due (10 Rx or 30 days ):    10/24/20 (done 10/14/20 with OP note/MD update)   20 or visit 16      Recertification will be due (POC Duration  / 90 days ): 20     Has the plan of care been signed (Y/N):        [x]  Yes  []  No         Visit # Date Range Insurance Allowable Requires auth   7 20-10/14/20 [facility]    [x]no        []yes:           SUBJECTIVE:  Patient reports his knee pain has become fairly consistent at a 3/10. MD said his knee will always be unstable. Pain level:  3/10 Pain increases with full extension in WB.     OBJECTIVE: See below   Observation: girth (cm) IP:38  MP:40.5 SP:39.5    Myofascial restrictions in the HS and ITB.    Decreased quad control during gait    PT Practice Pattern:D  Co morbidities:3+  SURgical 20 L knee LBR chondroplasty  INITIAL VISIT 20 CURRENT VISIT 10/14/20   PAIN 0-10/10 4-6/10 310   FUNCTIONAL SCALE LEFS (10/80) 88% (37/80) 54%   L knee AROM KE -5 -5     118                           STRENGHT (MMT) Quad tone Fair Fair +    KE NT 4+/5    KF NT 4/5    HF NT 4+/5                     RESTRICTIONS/PRECAUTIONS: vertigo    Exercises/Interventions:     HEP:Access Code: [email or web address]   Patient Portal: [email or web address]       Therapeutic Ex (64390) Reps/Sets/time Notes/CUES HEP   3x30\" reviewed X    3x30\" reviewed X    10x5\"  X    SLR flexion See NMES  X    SAQ See NMES  X    15x5\"     incline stretch LLE 3x30\"     Trustretch HS stretch IR/ER/N 3x15\" ea         Bridges Abd OVL 5\" x 20             5# weight         VC and TC for quad activation and prevention of pelvic compensations     TKE [facility] [place] 45# 5\" X 20     Recumbent bike 5' Warm up and ROM              Pt education x5' Importance of improving quad and hip strength improve stability. Manual Intervention (46510)      Patellar mobs GI-II,  2'     Manual prone quad stretch 3'         ITB roller, HS and quad massage for flexibility IASTM 5'               NMR re-education (05926)  CUES NEEDED                   NMES (Biphasic ) SAQ5'/SLR 5' 10\"/10\"  2.0 ramp                            Therapeutic Activity (90909)                                                Therapeutic Exercise and NMR EXR  [x] (05481) Provided verbal/tactile cueing for activities related to strengthening, flexibility, endurance, ROM for improvements in LE, proximal hip, and core control with self care, mobility, lifting, ambulation. [x] (26813) Provided verbal/tactile cueing for activities related to improving balance, coordination, kinesthetic sense, posture, motor skill, proprioception  to assist with LE, proximal hip, and core control in self care, mobility, lifting, ambulation and eccentric single leg control.      NMR and Therapeutic Activities:    [x] (23554 or 96873) Provided verbal/tactile cueing for activities related to improving balance, coordination, kinesthetic sense, posture, motor skill, proprioception and motor activation to allow for proper function of core, proximal hip and LE with self care and ADLs  [] (47246) Gait Re-education- Provided training and instruction to the patient for proper LE, core and proximal hip recruitment and positioning and eccentric body weight control with ambulation re-education including up and down stairs     Home Exercise Program:    [x] (57823) Reviewed/Progressed HEP activities related to strengthening, flexibility, endurance, ROM of core, proximal hip and LE for functional self-care, mobility, lifting and ambulation/stair navigation   [] (65991)Reviewed/Progressed HEP activities related to improving balance, coordination, kinesthetic sense, posture, motor skill, proprioception of core, proximal hip and LE for self care, mobility, lifting, and ambulation/stair navigation      Manual Treatments:  PROM / STM / Oscillations-Mobs:  G-I, II, III, IV (PA's, Inf., Post.)  [x] (48146) Provided manual therapy to mobilize LE, proximal hip and/or LS spine soft tissue/joints for the purpose of modulating pain, promoting relaxation,  increasing ROM, reducing/eliminating soft tissue swelling/inflammation/restriction, improving soft tissue extensibility and allowing for proper ROM for normal function with self care, mobility, lifting and ambulation. Trigger point Dry Needling:  fine needle insertion into myofascial trigger points to stimulate a healing response  [] (72360) Needle insertion without injection: 1 or 2 muscles  [] (43361) Needle insertion without injection: 3 or more muscles    Modalities:  Ice at home this visit   [] GAME READY (VASO)- for significant edema, swelling, pain control.     [] ESU (HV/PM) for edema and pain control       Charges:  Timed Code Treatment Minutes: 45'   Total Treatment Minutes: 48'         [] EVAL (LOW) 80775 (typically 20 minutes face-to-face)  [] EVAL (MOD) 81017 (typically 30 minutes face-to-face)  [] EVAL (HIGH) 423 8339 (typically 45 minutes face-to-face)  [] RE-EVAL     [x] GH(92867) x 1   [] IONTO  [x] NMR (28286) x  1   [] VASO  [x] Manual (51223) x  1    [] Other:  [] TA x      [] Mech Traction (99945)  [] ES(attended) (12810)      [x] ES (un) (17819): Biphasic NMES    GOALS:     Patient stated goal: To walk and play golf again. [x]? Progressing: []? Met: []? Not Met: []? Adjusted     Therapist goals for Patient:   Short Term Goals: To be achieved in: 2 weeks  1. Independent in HEP and progression per patient tolerance, in order to prevent re-injury. [x]? Progressing: []? Met: []? Not Met: []? Adjusted  2. Patient will have a decrease in pain to facilitate improvement in movement, function, and ADLs as indicated by Functional Deficits. [x]? Progressing: []? Met: []? Not Met: []? Adjusted     Long Term Goals: To be achieved in: 8 weeks  1. Disability index score of 44% or less for the LEFS to assist with reaching prior level of function. [x]? Progressing: []? Met: []? Not Met: []? Adjusted  2. Patient will demonstrate increased AROM to -5 to 130 to allow for proper joint functioning as indicated by patients Functional Deficits. [x]? Progressing: []? Met: []? Not Met: []? Adjusted  3. Patient will demonstrate an increase in Strength to good proximal hip strength and control, 4+/5 in LE to allow for proper functional mobility as indicated by patients Functional Deficits. [x]? Progressing: []? Met: []? Not Met: []? Adjusted  4. Patient will return to daily yardwork functional activities without increased symptoms or restriction. [x]? Progressing: []? Met: []? Not Met: []? Adjusted  5. Pt will be able to golf 9 holes without restrictions from knee pain. (patient specific functional goal)    []? Progressing: []? Met: []? Not Met: []? Adjusted              ASSESSMENT: Patient continues to demonstrate edema surrounding left knee, limiting his quad control, which is apparent during gait and other functional activities.  Discussed with

## 2020-10-21 ENCOUNTER — HOSPITAL ENCOUNTER (OUTPATIENT)
Dept: PHYSICAL THERAPY | Age: 70
Setting detail: THERAPIES SERIES
Discharge: HOME | End: 2020-10-21
Payer: MEDICARE

## 2020-10-21 PROCEDURE — 97140 MANUAL THERAPY 1/> REGIONS: CPT | Performed by: PHYSICAL THERAPIST

## 2020-10-21 PROCEDURE — 97110 THERAPEUTIC EXERCISES: CPT | Performed by: PHYSICAL THERAPIST

## 2020-10-21 PROCEDURE — G0283 ELEC STIM OTHER THAN WOUND: HCPCS | Performed by: PHYSICAL THERAPIST

## 2020-10-21 PROCEDURE — 97112 NEUROMUSCULAR REEDUCATION: CPT | Performed by: PHYSICAL THERAPIST

## 2020-10-21 NOTE — FLOWSHEET NOTE
[facility],  [address] [place]  Phone: [phone]  Fax [phone]    Physical Therapy Treatment Note/ Progress Report:           Date:  10/21/2020    Patient Name:  Rashmi Cortes    :  1950  MRN: [id number]    Medical/Treatment Diagnosis Information:  · Diagnosis: L knee M23.42 (ICD-10-CM) - Loose body of left knee  · Treatment Diagnosis: L knee pain (M25.652)    L knee stiffness (M25.662)  Difficulty walking (R26.2); s/p L knee LBR/chomdroplasty of PFJ and medial compartment   Insurance/Certification information:  PT Insurance Information: Medicare/Medicaid  Physician Information:  Referring Practitioner: Pamela Faust MD    Date of Patient follow up with Physician and OP note due: 10/15/20    Latex Allergy/Pacemaker/Adhesive allergy:  [x]NO      []YES      Is this a Progress Report:     []  Yes  [x]  No      If Yes:  Date Range for reporting period:  Beginnin20  Ending: 10/14/14    Progress report will be due (10 Rx or 30 days ):    10/24/20 (done 10/14/20 with OP note/MD update)   20 or visit 16      Recertification will be due (POC Duration  / 90 days ): 20     Has the plan of care been signed (Y/N):        [x]  Yes  []  No         Visit # Date Range Insurance Allowable Requires auth   8 20- [facility]    [x]no        []yes:           SUBJECTIVE:   Pt states at at his time he has aching in his knee when he is walking and doesn't understand why the swelling is still there. He complains mostly of L anterior HF pain since Monday. Pain level:  5/10 Pain increases with full extension in WB.     OBJECTIVE: See below   Observation: able to contract quad, unable to perform glute contraction without sub from oppo glute or HF   Tight L ant hip mobs, tight L piriformis, tight L HF.     PT Practice Pattern:D  Co morbidities:3+  SURgical 20 L knee LBR chondroplasty  INITIAL VISIT eccentric single leg control. NMR and Therapeutic Activities:    [x] (08458 or 33833) Provided verbal/tactile cueing for activities related to improving balance, coordination, kinesthetic sense, posture, motor skill, proprioception and motor activation to allow for proper function of core, proximal hip and LE with self care and ADLs  [] (06214) Gait Re-education- Provided training and instruction to the patient for proper LE, core and proximal hip recruitment and positioning and eccentric body weight control with ambulation re-education including up and down stairs     Home Exercise Program:    [x] (97907) Reviewed/Progressed HEP activities related to strengthening, flexibility, endurance, ROM of core, proximal hip and LE for functional self-care, mobility, lifting and ambulation/stair navigation   [] (69713)Reviewed/Progressed HEP activities related to improving balance, coordination, kinesthetic sense, posture, motor skill, proprioception of core, proximal hip and LE for self care, mobility, lifting, and ambulation/stair navigation      Manual Treatments:  PROM / STM / Oscillations-Mobs:  G-I, II, III, IV (PA's, Inf., Post.)  [x] (86726) Provided manual therapy to mobilize LE, proximal hip and/or LS spine soft tissue/joints for the purpose of modulating pain, promoting relaxation,  increasing ROM, reducing/eliminating soft tissue swelling/inflammation/restriction, improving soft tissue extensibility and allowing for proper ROM for normal function with self care, mobility, lifting and ambulation. Trigger point Dry Needling:  fine needle insertion into myofascial trigger points to stimulate a healing response  [] (24302) Needle insertion without injection: 1 or 2 muscles  [] (85013) Needle insertion without injection: 3 or more muscles    Modalities:  Ice at home this visit   [] GAME READY (VASO)- for significant edema, swelling, pain control.     [] ESU (HV/PM) for edema and pain control Charges:  Timed Code Treatment Minutes: 45'   Total Treatment Minutes: 48'         [] EVAL (LOW) 12421 (typically 20 minutes face-to-face)  [] EVAL (MOD) 08972 (typically 30 minutes face-to-face)  [] EVAL (HIGH) 66407 (typically 45 minutes face-to-face)  [] RE-EVAL     [x] BW(72605) x 1   [] IONTO  [x] NMR (49758) x  1   [] VASO  [x] Manual (39919) x  1    [] Other:  [] TA x      [] Mech Traction (56523)  [] ES(attended) (22941)      [x] ES (un) (95546): Biphasic NMES    GOALS:     Patient stated goal: To walk and play golf again. [x]? Progressing: []? Met: []? Not Met: []? Adjusted     Therapist goals for Patient:   Short Term Goals: To be achieved in: 2 weeks  1. Independent in HEP and progression per patient tolerance, in order to prevent re-injury. [x]? Progressing: []? Met: []? Not Met: []? Adjusted  2. Patient will have a decrease in pain to facilitate improvement in movement, function, and ADLs as indicated by Functional Deficits. [x]? Progressing: []? Met: []? Not Met: []? Adjusted     Long Term Goals: To be achieved in: 8 weeks  1. Disability index score of 44% or less for the LEFS to assist with reaching prior level of function. [x]? Progressing: []? Met: []? Not Met: []? Adjusted  2. Patient will demonstrate increased AROM to -5 to 130 to allow for proper joint functioning as indicated by patients Functional Deficits. [x]? Progressing: []? Met: []? Not Met: []? Adjusted  3. Patient will demonstrate an increase in Strength to good proximal hip strength and control, 4+/5 in LE to allow for proper functional mobility as indicated by patients Functional Deficits. [x]? Progressing: []? Met: []? Not Met: []? Adjusted  4. Patient will return to daily yardwork functional activities without increased symptoms or restriction. [x]? Progressing: []? Met: []? Not Met: []? Adjusted  5. Pt will be able to golf 9 holes without restrictions from knee pain. (patient specific functional goal)    []? Progressing: []? Met: []? Not Met: []? Adjusted              ASSESSMENT: Treatment today was focused on hip relief and restring proper mechanics as able as well as quad re ed w/o hip involvement. Patient continues to demonstrate edema surrounding left knee, limiting his quad control, which is apparent during gait and other functional activities. Discussed with patient that due to severe OA in his knee, he will likely have some pain moving forward, but by building strength it will improve is overall stability and function in his knee. He was also educated on due to the significant hx of swelling issues in this knee it is expected that it will take time to dissipate. Quad tone and [place] KE is greatly improved. Overall Progression Towards Functional goals/ Treatment Progress Update:  [] Patient is progressing as expected towards functional goals listed. [x] Progression is slowed due to complexities/Impairments listed. [] Progression has been slowed due to co-morbidities. [] Plan just implemented, too soon to assess goals progression <30days   [] Goals require adjustment due to lack of progress  [] Patient is not progressing as expected and requires additional follow up with physician  [] Other    Prognosis for POC: [x] Good [] Fair  [] Poor      Patient requires continued skilled intervention: [x] Yes  [] No    Treatment/Activity Tolerance:  [x] Patient able to complete treatment  [] Patient limited by fatigue  [] Patient limited by pain    [] Patient limited by other medical complications  [] Other:     PLAN: Cont 2x week for 2 weeks to work on edema control and quad re ed as well as KE for proper gait and decreased pain. Switch to WB therex NV for most of tx.   [x] Continue per plan of care [] Alter current plan (see comments above)  [] Plan of care initiated [] Hold pending MD visit [] Discharge      Electronically signed by:  Lee Kaminski, PT , [id number]    Note: If patient does not return for scheduled/ recommended follow up visits, this note will serve as a discharge from care along with most recent update on progress.

## 2020-10-26 ENCOUNTER — HOSPITAL ENCOUNTER (OUTPATIENT)
Dept: PHYSICAL THERAPY | Age: 70
Setting detail: THERAPIES SERIES
Discharge: HOME | End: 2020-10-26
Payer: MEDICARE

## 2020-10-28 ENCOUNTER — HOSPITAL ENCOUNTER (OUTPATIENT)
Dept: PHYSICAL THERAPY | Age: 70
Setting detail: THERAPIES SERIES
Discharge: HOME | End: 2020-10-28
Payer: MEDICARE

## 2020-10-28 PROCEDURE — 97140 MANUAL THERAPY 1/> REGIONS: CPT | Performed by: PHYSICAL THERAPIST

## 2020-10-28 PROCEDURE — 97110 THERAPEUTIC EXERCISES: CPT | Performed by: PHYSICAL THERAPIST

## 2020-10-28 PROCEDURE — 97112 NEUROMUSCULAR REEDUCATION: CPT | Performed by: PHYSICAL THERAPIST

## 2020-10-28 PROCEDURE — G0283 ELEC STIM OTHER THAN WOUND: HCPCS | Performed by: PHYSICAL THERAPIST

## 2020-10-28 NOTE — FLOWSHEET NOTE
[facility],  [address] [place]  Phone: [phone]  Fax [phone]    Physical Therapy Treatment Note/ Progress Report:           Date:  10/28/2020    Patient Name:  Veronica Deluca    :  1950  MRN: [id number]    Medical/Treatment Diagnosis Information:  · Diagnosis: L knee M23.42 (ICD-10-CM) - Loose body of left knee  · Treatment Diagnosis: L knee pain (M25.652)    L knee stiffness (M25.662)  Difficulty walking (R26.2); s/p L knee LBR/chomdroplasty of PFJ and medial compartment   Insurance/Certification information:  PT Insurance Information: Medicare/Medicaid  Physician Information:  Referring Practitioner: Jackee Romberg MD    Date of Patient follow up with Physician and OP note due: 10/15/20    Latex Allergy/Pacemaker/Adhesive allergy:  [x]NO      []YES      Is this a Progress Report:     []  Yes  [x]  No      If Yes:  Date Range for reporting period:  Beginnin20  Ending: 10/14/14    Progress report will be due (10 Rx or 30 days ):    10/24/20 (done 10/14/20 with OP note/MD update)   20 or visit 16      Recertification will be due (POC Duration  / 90 days ): 20     Has the plan of care been signed (Y/N):        [x]  Yes  []  No         Visit # Date Range Insurance Allowable Requires auth   9 20- [facility]    [x]no        []yes:           SUBJECTIVE:   Pt c/o of having some searing pain in his quad last night. He still has some achiness all the time.   He complains about the fact he still feels pain and is not back to normal. He states that he is working on his deck etc for an hour or so at a time throughout the day (despite instruction not to) but does not feel that this is affecting his knee (despite ed on such.)    Pain level:  5/10 Pain increases with full extension in WB.     OBJECTIVE: See below   Observation: able to contract quad, unable to perform glute contraction without sub from oppo glute or HF more than 10% of the time    PT Practice Pattern:D  Co morbidities:3+  SURgical 9/22/20 L knee LBR chondroplasty  INITIAL VISIT 9/24/20 CURRENT VISIT 10/14/20   PAIN 0-10/10 4-6/10 3/10   FUNCTIONAL SCALE LEFS (10/80) 88% (37/80) 54%   L knee AROM KE -5 -5     118                           STRENGHT (MMT) Quad tone Fair Fair +    KE NT 4+/5    KF NT 4/5    HF NT 4+/5                     RESTRICTIONS/PRECAUTIONS: vertigo    Exercises/Interventions:     HEP:Access Code: [email or web address]   Patient Portal: [email or web address]/       Therapeutic Ex (27722) Reps/Sets/time Notes/CUES HEP   See previous noted for full HEP   X         Pro slant and 1/2 roll HE/ and calf stretch 5x15\" L                                                                                   Piriformis stretch LLE supine 5x30\"                     Recumbent bike 6' L3 Warm up and ROM              Pt education x6' Glute, quad, gait, and why swelling is still present (see previous ed notes), caution with activity          Manual Intervention (37297)      Patellar mobs GI-II,  2'             I HS, ITB  and quad massage for flexibility 6'               NMR re-education (16098)  CUES NEEDED    Pro slant step through with LLE stance to promote GS/QS and KE with stance phase 30x         Biphasic quad NMES QS 4'  SLS LLE with RHF 4'   2.0 ramp  10\"/10\" cycle    Buckeyes with LLE SAQ and glute press + RHF Biphasic 6' As above                                  Therapeutic Activity (99957)                                                Therapeutic Exercise and NMR EXR  [x] (41439) Provided verbal/tactile cueing for activities related to strengthening, flexibility, endurance, ROM for improvements in LE, proximal hip, and core control with self care, mobility, lifting, ambulation.   [x] (39353) Provided verbal/tactile cueing for activities related to improving balance, coordination, kinesthetic sense, posture, motor skill, proprioception  to assist with LE, proximal hip, and core control in self care, mobility, lifting, ambulation and eccentric single leg control. NMR and Therapeutic Activities:    [x] (02398 or 43104) Provided verbal/tactile cueing for activities related to improving balance, coordination, kinesthetic sense, posture, motor skill, proprioception and motor activation to allow for proper function of core, proximal hip and LE with self care and ADLs  [] (78539) Gait Re-education- Provided training and instruction to the patient for proper LE, core and proximal hip recruitment and positioning and eccentric body weight control with ambulation re-education including up and down stairs     Home Exercise Program:    [x] (50356) Reviewed/Progressed HEP activities related to strengthening, flexibility, endurance, ROM of core, proximal hip and LE for functional self-care, mobility, lifting and ambulation/stair navigation   [] (24998)Reviewed/Progressed HEP activities related to improving balance, coordination, kinesthetic sense, posture, motor skill, proprioception of core, proximal hip and LE for self care, mobility, lifting, and ambulation/stair navigation      Manual Treatments:  PROM / STM / Oscillations-Mobs:  G-I, II, III, IV (PA's, Inf., Post.)  [x] (65288) Provided manual therapy to mobilize LE, proximal hip and/or LS spine soft tissue/joints for the purpose of modulating pain, promoting relaxation,  increasing ROM, reducing/eliminating soft tissue swelling/inflammation/restriction, improving soft tissue extensibility and allowing for proper ROM for normal function with self care, mobility, lifting and ambulation.      Trigger point Dry Needling:  fine needle insertion into myofascial trigger points to stimulate a healing response  [] (86180) Needle insertion without injection: 1 or 2 muscles  [] (57673) Needle insertion without injection: 3 or more muscles    Modalities:  Ice at home this visit   [] GAME READY (VASO)- for significant edema, swelling, pain control. [] ESU (HV/PM) for edema and pain control       Charges:  Timed Code Treatment Minutes: Total Treatment Minutes:          [] EVAL (LOW) 13602 (typically 20 minutes face-to-face)  [] EVAL (MOD) 75034 (typically 30 minutes face-to-face)  [] EVAL (HIGH) 02562 (typically 45 minutes face-to-face)  [] RE-EVAL     [x] UE(28748) x 1   [] IONTO  [x] NMR (27514) x  1   [] VASO  [x] Manual (98429) x  1    [] Other:  [] TA x      [] Mech Traction (61408)  [] ES(attended) (84209)      [x] ES (un) (57472): Biphasic NMES    GOALS:     Patient stated goal: To walk and play golf again. [x]? Progressing: []? Met: []? Not Met: []? Adjusted     Therapist goals for Patient:   Short Term Goals: To be achieved in: 2 weeks  1. Independent in HEP and progression per patient tolerance, in order to prevent re-injury. [x]? Progressing: []? Met: []? Not Met: []? Adjusted  2. Patient will have a decrease in pain to facilitate improvement in movement, function, and ADLs as indicated by Functional Deficits. [x]? Progressing: []? Met: []? Not Met: []? Adjusted     Long Term Goals: To be achieved in: 8 weeks  1. Disability index score of 44% or less for the LEFS to assist with reaching prior level of function. [x]? Progressing: []? Met: []? Not Met: []? Adjusted  2. Patient will demonstrate increased AROM to -5 to 130 to allow for proper joint functioning as indicated by patients Functional Deficits. [x]? Progressing: []? Met: []? Not Met: []? Adjusted  3. Patient will demonstrate an increase in Strength to good proximal hip strength and control, 4+/5 in LE to allow for proper functional mobility as indicated by patients Functional Deficits. [x]? Progressing: []? Met: []? Not Met: []? Adjusted  4. Patient will return to daily yardwork functional activities without increased symptoms or restriction. [x]? Progressing: []? Met: []? Not Met: []? Adjusted  5.  Pt will be able to golf 9 holes without restrictions from knee pain. (patient specific functional goal)    []? Progressing: []? Met: []? Not Met: []? Adjusted              ASSESSMENT: Pt education continues on the progression and precautions of this sx and his recovery process. Pt is very disconcerted that he will feel as he does now forever but does not want to decrease his activity in order to improve. It was discussed today that he is going to have to change his expectations of his knee if he chooses not to change his activity level temporarily. Today's visit was focused on  Continued re ed of cocontraction between quad and glute to promote improved gait. Patient was able to perform co contraction of glute and quads by end of visit with carefull selected activities to avoid substitution. He is able to ambulate with proper gait form when he slows down and concentrates on his form. Overall Progression Towards Functional goals/ Treatment Progress Update:  [] Patient is progressing as expected towards functional goals listed. [x] Progression is slowed due to complexities/Impairments listed. [] Progression has been slowed due to co-morbidities. [] Plan just implemented, too soon to assess goals progression <30days   [] Goals require adjustment due to lack of progress  [] Patient is not progressing as expected and requires additional follow up with physician  [] Other    Prognosis for POC: [x] Good [] Fair  [] Poor      Patient requires continued skilled intervention: [x] Yes  [] No    Treatment/Activity Tolerance:  [x] Patient able to complete treatment  [] Patient limited by fatigue  [] Patient limited by pain    [] Patient limited by other medical complications  [] Other:     PLAN: Recommend and had patient schedule to cont 2x week for 2 weeks to work on edema control and quad re ed as well as KE for proper gait and decreased pain. Cont CKC /WB therex and NMR.   [x] Continue per plan of care [] Alter current plan (see comments above)  [] Plan of care initiated [] Hold pending MD visit [] Discharge      Electronically signed by:  Aydee Herbert, PT , [id number]    Note: If patient does not return for scheduled/ recommended follow up visits, this note will serve as a discharge from care along with most recent update on progress.

## 2020-11-02 ENCOUNTER — HOSPITAL ENCOUNTER (OUTPATIENT)
Dept: PHYSICAL THERAPY | Age: 70
Setting detail: THERAPIES SERIES
Discharge: HOME | End: 2020-11-02
Payer: MEDICARE

## 2020-11-02 PROCEDURE — 97140 MANUAL THERAPY 1/> REGIONS: CPT

## 2020-11-02 PROCEDURE — 97112 NEUROMUSCULAR REEDUCATION: CPT

## 2020-11-02 PROCEDURE — 97110 THERAPEUTIC EXERCISES: CPT

## 2020-11-02 NOTE — FLOWSHEET NOTE
[facility], [address] [place]  Phone: [phone]  Fax [phone]    Physical Therapy Treatment Note/ Progress Report:           Date:  2020    Patient Name:  Estrella Buckley    :  1950  MRN: [id number]    Medical/Treatment Diagnosis Information:  · Diagnosis: L knee M23.42 (ICD-10-CM) - Loose body of left knee  · Treatment Diagnosis: L knee pain (M25.652)    L knee stiffness (M25.662)  Difficulty walking (R26.2); s/p L knee LBR/chomdroplasty of PFJ and medial compartment 3/65/18  Insurance/Certification information:  PT Insurance Information: Medicare/Medicaid  Physician Information:  Referring Practitioner: Elder Butts MD    Date of Patient follow up with Physician and OP note due: 10/15/20    Latex Allergy/Pacemaker/Adhesive allergy:  [x]NO      []YES      Is this a Progress Report:     []  Yes  [x]  No      If Yes:  Date Range for reporting period:  Beginnin20  Ending: 10/14/14    Progress report will be due (10 Rx or 30 days ):    10/24/20 (done 10/14/20 with OP note/MD update)   20 or visit 16      Recertification will be due (POC Duration  / 90 days ): 20     Has the plan of care been signed (Y/N):        [x]  Yes  []  No         Visit # Date Range Insurance Allowable Requires auth   10 20- [facility]    [x]no        []yes:           SUBJECTIVE:   Pt has been without his crutch for a little over a week. He has been doing better on even surfaces but has more difficulty when walking in his yard on uneven surfaces. He has been having groin pain when going from sitting to standing. Pain level:  0/10 Pain increases with walking on uneven surfaces and with sleep.      OBJECTIVE: See below   Observation: able to contract quad, unable to perform glute contraction without sub from oppo glute or HF more than 10% of the time    PT Practice Pattern:D  Co morbidities:3+  SURgical 20 L knee LBR chondroplasty  INITIAL VISIT 9/24/20 CURRENT VISIT 11/2/20   PAIN 0-10/10 4-6/10 0/10   FUNCTIONAL SCALE LEFS (10/80) 88% NT this date    L knee AROM KE -5 -5     125                           STRENGHT (MMT) Quad tone Fair Fair +    KE NT 4+/5    KF NT 4/5    HF NT 4+/5                     RESTRICTIONS/PRECAUTIONS: vertigo    Exercises/Interventions:     HEP:Access Code: [email or web address]   Patient Portal: [email or web address]/       Therapeutic Ex (61352) Reps/Sets/time Notes/CUES HEP   See previous noted for full HEP   X         Pro slant and 1/2 roll HE/ and calf stretch 5x15\" L     Supine HF stretch 30\"x3 B Pt reported stretch felt where he has been having pain    SLRF 3x10 No quad lag noted           Trustretch KF, ankle DF stretch 10\"x10                                                                               Recumbent bike 6' L3 Warm up and ROM              Pt education x6' Glute, quad, gait, and why swelling is still present (see previous ed notes), caution with activity          Manual Intervention (85056)      IASTM L quad, VL, ITB, patellar mobs, PROM with OP for L KF, L knee flexion mobs for tibiofemoral and tibfib joints 10'                             NMR re-education (92046)  CUES NEEDED    Pro slant step through with LLE stance to promote GS/QS and KE with stance phase 30x         2.0 ramp  10\"/10\" cycle    Buckeyes with LLE SAQ and glute press + RHF Biphasic 6' As above    CC retro walking  10'x4 20#, only able to tolerate 4 reps 2/2 pain    L LE stance w B UE ext red theratubes 10x  Difficulty tolerating 2/2 pain in L knee                      Therapeutic Activity (54330)                                                Therapeutic Exercise and NMR EXR  [x] (83233) Provided verbal/tactile cueing for activities related to strengthening, flexibility, endurance, ROM for improvements in LE, proximal hip, and core control with self care, mobility, lifting, ambulation.   [x] (75774) Provided verbal/tactile cueing for activities related to improving balance, coordination, kinesthetic sense, posture, motor skill, proprioception  to assist with LE, proximal hip, and core control in self care, mobility, lifting, ambulation and eccentric single leg control. NMR and Therapeutic Activities:    [x] (48754 or 15691) Provided verbal/tactile cueing for activities related to improving balance, coordination, kinesthetic sense, posture, motor skill, proprioception and motor activation to allow for proper function of core, proximal hip and LE with self care and ADLs  [] (26471) Gait Re-education- Provided training and instruction to the patient for proper LE, core and proximal hip recruitment and positioning and eccentric body weight control with ambulation re-education including up and down stairs     Home Exercise Program:    [x] (53226) Reviewed/Progressed HEP activities related to strengthening, flexibility, endurance, ROM of core, proximal hip and LE for functional self-care, mobility, lifting and ambulation/stair navigation   [] (29722)Reviewed/Progressed HEP activities related to improving balance, coordination, kinesthetic sense, posture, motor skill, proprioception of core, proximal hip and LE for self care, mobility, lifting, and ambulation/stair navigation      Manual Treatments:  PROM / STM / Oscillations-Mobs:  G-I, II, III, IV (PA's, Inf., Post.)  [x] (96603) Provided manual therapy to mobilize LE, proximal hip and/or LS spine soft tissue/joints for the purpose of modulating pain, promoting relaxation,  increasing ROM, reducing/eliminating soft tissue swelling/inflammation/restriction, improving soft tissue extensibility and allowing for proper ROM for normal function with self care, mobility, lifting and ambulation.      Trigger point Dry Needling:  fine needle insertion into myofascial trigger points to stimulate a healing response  [] (62564) Needle insertion without injection: 1 or 2 muscles  [] (81952) Needle insertion without injection: 3 or more muscles    Modalities:  CPx10'   [] GAME READY (VASO)- for significant edema, swelling, pain control. [] ESU (HV/PM) for edema and pain control       Charges:  Timed Code Treatment Minutes: 45'   Total Treatment Minutes: 50'         [] EVAL (LOW) 96007 (typically 20 minutes face-to-face)  [] EVAL (MOD) 33602 (typically 30 minutes face-to-face)  [] EVAL (HIGH) 47368 (typically 45 minutes face-to-face)  [] RE-EVAL     [x] XV(38041) x 1   [] IONTO  [x] NMR (57246) x  1   [] VASO  [x] Manual (99871) x  1    [] Other:  [] TA x      [] Mech Traction (61457)  [] ES(attended) (51308)      [] ES (un) (10712): Biphasic NMES    GOALS:     Patient stated goal: To walk and play golf again. [x]? Progressing: []? Met: []? Not Met: []? Adjusted     Therapist goals for Patient:   Short Term Goals: To be achieved in: 2 weeks  1. Independent in HEP and progression per patient tolerance, in order to prevent re-injury. [x]? Progressing: []? Met: []? Not Met: []? Adjusted  2. Patient will have a decrease in pain to facilitate improvement in movement, function, and ADLs as indicated by Functional Deficits. [x]? Progressing: []? Met: []? Not Met: []? Adjusted     Long Term Goals: To be achieved in: 8 weeks  1. Disability index score of 44% or less for the LEFS to assist with reaching prior level of function. [x]? Progressing: []? Met: []? Not Met: []? Adjusted  2. Patient will demonstrate increased AROM to -5 to 130 to allow for proper joint functioning as indicated by patients Functional Deficits. [x]? Progressing: []? Met: []? Not Met: []? Adjusted  3. Patient will demonstrate an increase in Strength to good proximal hip strength and control, 4+/5 in LE to allow for proper functional mobility as indicated by patients Functional Deficits. [x]? Progressing: []? Met: []? Not Met: []? Adjusted  4.  Patient will return to daily yardwork functional activities without increased symptoms or restriction. [x]? Progressing: []? Met: []? Not Met: []? Adjusted  5. Pt will be able to golf 9 holes without restrictions from knee pain. (patient specific functional goal)    []? Progressing: []? Met: []? Not Met: []? Adjusted              ASSESSMENT:   Today's visit was focused on  Continued re ed of cocontraction between quad and glute to promote improved gait. Patient had improved quad strength this date with 30 reps of SLRF without quad lag noted. Patient did have difficulty performing CKC exercises secondary to pain felt in L knee when working on cocontraction exercises. Overall Progression Towards Functional goals/ Treatment Progress Update:  [] Patient is progressing as expected towards functional goals listed. [x] Progression is slowed due to complexities/Impairments listed. [] Progression has been slowed due to co-morbidities. [] Plan just implemented, too soon to assess goals progression <30days   [] Goals require adjustment due to lack of progress  [] Patient is not progressing as expected and requires additional follow up with physician  [] Other    Prognosis for POC: [x] Good [] Fair  [] Poor      Patient requires continued skilled intervention: [x] Yes  [] No    Treatment/Activity Tolerance:  [x] Patient able to complete treatment  [] Patient limited by fatigue  [] Patient limited by pain    [] Patient limited by other medical complications  [] Other:     PLAN: Recommend and had patient schedule to cont 2x week for 2 weeks to work on edema control and quad re ed as well as KE for proper gait and decreased pain. Cont CKC /WB therex and NMR.   [x] Continue per plan of care [] Alter current plan (see comments above)  [] Plan of care initiated [] Hold pending MD visit [] Discharge      Electronically signed by:  James Villarreal, PT, DPT, [facility] [id number]     Note: If patient does not return for scheduled/ recommended follow up visits, this note will serve as a discharge from care along with most recent update on progress.

## 2020-11-04 ENCOUNTER — HOSPITAL ENCOUNTER (OUTPATIENT)
Dept: PHYSICAL THERAPY | Age: 70
Setting detail: THERAPIES SERIES
Discharge: HOME | End: 2020-11-04
Payer: MEDICARE

## 2020-11-04 PROCEDURE — 97112 NEUROMUSCULAR REEDUCATION: CPT | Performed by: PHYSICAL THERAPIST

## 2020-11-04 PROCEDURE — 97110 THERAPEUTIC EXERCISES: CPT | Performed by: PHYSICAL THERAPIST

## 2020-11-04 NOTE — FLOWSHEET NOTE
[facility],  [address] [place]  Phone: [phone]  Fax [phone]    Physical Therapy Treatment Note/ Progress Report:           Date:  2020    Patient Name:  Earma Schlatter    :  1950  MRN: [id number]    Medical/Treatment Diagnosis Information:  · Diagnosis: L knee M23.42 (ICD-10-CM) - Loose body of left knee  · Treatment Diagnosis: L knee pain (M25.652)    L knee stiffness (M25.662)  Difficulty walking (R26.2); s/p L knee LBR/chomdroplasty of PFJ and medial compartment   Insurance/Certification information:  PT Insurance Information: Medicare/Medicaid  Physician Information:  Referring Practitioner: Sreekanth Dsouza MD    Date of Patient follow up with Physician and OP note due: 10/15/20    Latex Allergy/Pacemaker/Adhesive allergy:  [x]NO      []YES      Is this a Progress Report:     []  Yes  [x]  No      If Yes:  Date Range for reporting period:  Beginnin20  Ending: 10/14/14    Progress report will be due (10 Rx or 30 days ):    10/24/20 (done 10/14/20 with OP note/MD update)   20 or visit 16      Recertification will be due (POC Duration  / 90 days ): 20     Has the plan of care been signed (Y/N):        [x]  Yes  []  No         Visit # Date Range Insurance Allowable Requires auth   11 20- [facility]    [x]no        []yes:           SUBJECTIVE:    Pt reporrts his knee feels good, he can walk with minimal pain if he keeps it bent.     Pain level:  0/10 Pain increases     OBJECTIVE: See below   Observation: able to contract quad, unable to perform glute contraction without sub from oppo glute or HF more than 10% of the time    PT Practice Pattern:D  Co morbidities:3+  SURgical 20 L knee LBR chondroplasty  INITIAL VISIT 20 CURRENT VISIT 20   PAIN 0-10/10 4-6/10 0/10   FUNCTIONAL SCALE LEFS (10/80) 88% NT this date    L knee AROM KE -5 -5     784 STRENGHT (MMT) Quad tone Fair Fair +    KE NT 4+/5    KF NT 4/5    HF NT 4+/5                     RESTRICTIONS/PRECAUTIONS: vertigo    Exercises/Interventions:     HEP:Access Code: [email or web address]   Patient Portal: [email or web address]       Therapeutic Ex (01156) Reps/Sets/time Notes/CUES HEP   See previous noted for full HEP   X             Supine HF stretch 30\"x3 B Pt reported stretch felt where he has been having pain    SLRF 3x10 No quad lag noted     Incline BLEs gastroc/soleus 3x30\" ea     Trustretch HS,KF, ankle DF stretch 3x30\" ea           SUZI TKE R/L  SUZI HF R/L  SUZI HE R/L (60#) 20x3\"ea  (45#) 10xea  (60#) 15x3\" ea Focus on pelvic neutral for all exercises  R HF causes pain in L knee due to extension                                                                   Recumbent bike 6' L3 Warm up and ROM              Pt education x6' Glute, quad, gait, and why swelling is still present (see previous ed notes), caution with activity (all reviewed again today)          Manual Intervention (98032)                                  NMR re-education (93629)  CUES NEEDED                Buckeyes with pilates ring and R HF No NMES 20x3\"           Lateral band walk GVL 25ft 2 laps Cues for KE  Band for HEP    \"robot walk\" with band at knees GVL 25ft 2 laps Issued band for HEP  Cues for KE X 11/4         Therapeutic Activity (05728)                                                Therapeutic Exercise and NMR EXR  [x] (29987) Provided verbal/tactile cueing for activities related to strengthening, flexibility, endurance, ROM for improvements in LE, proximal hip, and core control with self care, mobility, lifting, ambulation. [x] (54845) Provided verbal/tactile cueing for activities related to improving balance, coordination, kinesthetic sense, posture, motor skill, proprioception  to assist with LE, proximal hip, and core control in self care, mobility, lifting, ambulation and eccentric single leg control. NMR and Therapeutic Activities:    [x] (58388 or 79500) Provided verbal/tactile cueing for activities related to improving balance, coordination, kinesthetic sense, posture, motor skill, proprioception and motor activation to allow for proper function of core, proximal hip and LE with self care and ADLs  [] (96546) Gait Re-education- Provided training and instruction to the patient for proper LE, core and proximal hip recruitment and positioning and eccentric body weight control with ambulation re-education including up and down stairs     Home Exercise Program:    [x] (43662) Reviewed/Progressed HEP activities related to strengthening, flexibility, endurance, ROM of core, proximal hip and LE for functional self-care, mobility, lifting and ambulation/stair navigation   [] (26068)Reviewed/Progressed HEP activities related to improving balance, coordination, kinesthetic sense, posture, motor skill, proprioception of core, proximal hip and LE for self care, mobility, lifting, and ambulation/stair navigation      Manual Treatments:  PROM / STM / Oscillations-Mobs:  G-I, II, III, IV (PA's, Inf., Post.)  [] (96377) Provided manual therapy to mobilize LE, proximal hip and/or LS spine soft tissue/joints for the purpose of modulating pain, promoting relaxation,  increasing ROM, reducing/eliminating soft tissue swelling/inflammation/restriction, improving soft tissue extensibility and allowing for proper ROM for normal function with self care, mobility, lifting and ambulation. Trigger point Dry Needling:  fine needle insertion into myofascial trigger points to stimulate a healing response  [] (35156) Needle insertion without injection: 1 or 2 muscles  [] (49634) Needle insertion without injection: 3 or more muscles    Modalities:     [] GAME READY (VASO)- for significant edema, swelling, pain control.     [] ESU (HV/PM) for edema and pain control       Charges:  Timed Code Treatment Minutes: 50   Total Treatment Pt continues to demonstrate poor gait pattern with 20-30 knee flexion with stance phase, therefore limp and subsequent Trendelenbur gait as well. He is resistant to trying to ambulate properly (which is possible  With cuing and concentration) because of knee pain. Pt seems to be satisfied with no pain and poor gait at this juncture. He is concerned about doing heavy activity ASAP. Progress has appeared to plateau in terms of gait and WB quad work. Recommend update HEP and discharge after next 2 visits due to lack of progress and motivation at this time. Pt will continue to progress if he continues his HEP. Overall Progression Towards Functional goals/ Treatment Progress Update:  [] Patient is progressing as expected towards functional goals listed. [x] Progression is slowed due to complexities/Impairments listed. [] Progression has been slowed due to co-morbidities. [] Plan just implemented, too soon to assess goals progression <30days   [] Goals require adjustment due to lack of progress  [] Patient is not progressing as expected and requires additional follow up with physician  [x] Other: see assessment    Prognosis for POC: [x] Good [x] Fair  [] Poor      Patient requires continued skilled intervention: [x] Yes  [] No    Treatment/Activity Tolerance:  [x] Patient able to complete treatment  [] Patient limited by fatigue  [] Patient limited by pain    [] Patient limited by other medical complications  [] Other:     PLAN: Cont 3 further visits to attempt tpo reinforce the need for KE with gait to avoid further damage. Solidify HEP. [x] Continue per plan of care [] Alter current plan (see comments above)  [] Plan of care initiated [] Hold pending MD visit [] Discharge      Electronically signed by:  Rossana Hardy PT, [id number]    Note: If patient does not return for scheduled/ recommended follow up visits, this note will serve as a discharge from care along with most recent update on progress.

## 2020-11-09 ENCOUNTER — HOSPITAL ENCOUNTER (OUTPATIENT)
Dept: PHYSICAL THERAPY | Age: 70
Setting detail: THERAPIES SERIES
Discharge: HOME | End: 2020-11-09
Payer: MEDICARE

## 2020-11-09 PROCEDURE — 97110 THERAPEUTIC EXERCISES: CPT

## 2020-11-09 PROCEDURE — 97112 NEUROMUSCULAR REEDUCATION: CPT

## 2020-11-09 NOTE — FLOWSHEET NOTE
[facility],  [address]  Phone: [phone]  Fax [phone]    Physical Therapy Treatment Note/ Progress Report:           Date:  2020    Patient Name:  Estrella Buckley    :  1950  MRN: [id number]    Medical/Treatment Diagnosis Information:  · Diagnosis: L knee M23.42 (ICD-10-CM) - Loose body of left knee  · Treatment Diagnosis: L knee pain (M25.652)    L knee stiffness (M25.662)  Difficulty walking (R26.2); s/p L knee LBR/chomdroplasty of PFJ and medial compartment   Insurance/Certification information:  PT Insurance Information: Medicare/Medicaid  Physician Information:  Referring Practitioner: Elder Butts MD    Date of Patient follow up with Physician and OP note due: 10/15/20    Latex Allergy/Pacemaker/Adhesive allergy:  [x]NO      []YES      Is this a Progress Report:     []  Yes  [x]  No      If Yes:  Date Range for reporting period:  Beginnin20  Ending: 10/14/14    Progress report will be due (10 Rx or 30 days ):    10/24/20 (done 10/14/20 with OP note/MD update)   20 or visit 16      Recertification will be due (POC Duration  / 90 days ): 20     Has the plan of care been signed (Y/N):        [x]  Yes  []  No         Visit # Date Range Insurance Allowable Requires auth   12 20- [facility]    [x]no        []yes:           SUBJECTIVE:    Pt is feeling better. He notes he has pain mainly this morning when he put his shoes on but did not have pain when walking barefoot through his house. He also reports feeling like he has not been able to fully straighten his L knee for 10 years and so his limited motion is not just from the surgery.       Pain level:  0/10 Pain increases     OBJECTIVE: See below   Observation: able to contract quad, unable to perform glute contraction without sub from oppo glute or HF more than 10% of the time    PT Practice Pattern:D  Co morbidities:3+  SURgical 9/22/20 L knee LBR chondroplasty  INITIAL VISIT 9/24/20 CURRENT VISIT 11/9/20   PAIN 0-10/10 4-6/10 0/10   FUNCTIONAL SCALE LEFS (10/80) 88% NT this date    L knee AROM KE -5 -2     125                           STRENGHT (MMT) Quad tone Fair Good    KE NT 4+/5    KF NT 4/5    HF NT 4+/5                     RESTRICTIONS/PRECAUTIONS: vertigo    Exercises/Interventions:     HEP:Access Code: [email or web address]   Patient Portal: [email or web address]/       Therapeutic Ex (76544) Reps/Sets/time Notes/CUES HEP   See previous noted for full HEP   X             Supine HF stretch 30\"x3 B Pt reported stretch felt where he has been having pain    SLRF 3x10 No quad lag noted     Incline BLEs gastroc/soleus 3x30\" ea     Trustretch HS,KF, ankle DF stretch 3x30\" ea           SUZI TKE R/L    SUZI HE R/L  SUZI HABD R/L (60#) 20x3\"ea  (45#) 10xea  (60#) 15x3\" ea  (45#)3x10  Focus on pelvic neutral for all exercises  R HF causes pain in L knee due to extension           LP      DL 3x10 80#    2 up, 1 down 3x10 60#                                          Recumbent bike 5' L3 Warm up and ROM              Pt education x6' Glute, quad, gait, and why swelling is still present (see previous ed notes), caution with activity (all reviewed again today)          Manual Intervention (61104)                                  NMR re-education (91203)  CUES NEEDED                 No NMES 20x3\"           Lateral band walk GVL 15ft 3 laps   Band for HEP X11/4   \"robot walk\" with band at knees GVL 25ft 2 laps Issued band for HEP  Cues for KE X 11/4         Therapeutic Activity (32494)                                                Therapeutic Exercise and NMR EXR  [x] (17091) Provided verbal/tactile cueing for activities related to strengthening, flexibility, endurance, ROM for improvements in LE, proximal hip, and core control with self care, mobility, lifting, ambulation.   [x] (54637) Provided verbal/tactile cueing for activities related to improving balance, coordination, kinesthetic sense, posture, motor skill, proprioception  to assist with LE, proximal hip, and core control in self care, mobility, lifting, ambulation and eccentric single leg control. NMR and Therapeutic Activities:    [x] (35489 or 63165) Provided verbal/tactile cueing for activities related to improving balance, coordination, kinesthetic sense, posture, motor skill, proprioception and motor activation to allow for proper function of core, proximal hip and LE with self care and ADLs  [] (21733) Gait Re-education- Provided training and instruction to the patient for proper LE, core and proximal hip recruitment and positioning and eccentric body weight control with ambulation re-education including up and down stairs     Home Exercise Program:    [x] (93992) Reviewed/Progressed HEP activities related to strengthening, flexibility, endurance, ROM of core, proximal hip and LE for functional self-care, mobility, lifting and ambulation/stair navigation   [] (07632)Reviewed/Progressed HEP activities related to improving balance, coordination, kinesthetic sense, posture, motor skill, proprioception of core, proximal hip and LE for self care, mobility, lifting, and ambulation/stair navigation      Manual Treatments:  PROM / STM / Oscillations-Mobs:  G-I, II, III, IV (PA's, Inf., Post.)  [] (83879) Provided manual therapy to mobilize LE, proximal hip and/or LS spine soft tissue/joints for the purpose of modulating pain, promoting relaxation,  increasing ROM, reducing/eliminating soft tissue swelling/inflammation/restriction, improving soft tissue extensibility and allowing for proper ROM for normal function with self care, mobility, lifting and ambulation.      Trigger point Dry Needling:  fine needle insertion into myofascial trigger points to stimulate a healing response  [] (57705) Needle insertion without injection: 1 or 2 muscles  [] (53747) Needle insertion without injection: 3 or more muscles    Modalities:     [] GAME READY (VASO)- for significant edema, swelling, pain control. [] ESU (HV/PM) for edema and pain control       Charges:  Timed Code Treatment Minutes: 37'   Total Treatment Minutes: 37'         [] EVAL (LOW) 96551 (typically 20 minutes face-to-face)  [] EVAL (MOD) 87112 (typically 30 minutes face-to-face)  [] EVAL (HIGH) 83223 (typically 45 minutes face-to-face)  [] RE-EVAL     [x] RX(19109) x 2   [] IONTO  [x] NMR (98147) x  1   [] VASO  [] Manual (57791) x      [] Other:  [] TA x      [] Mech Traction (74663)  [] ES(attended) (57478)      [] ES (un) (55849): Biphasic NMES    GOALS:     Patient stated goal: To walk and play golf again. [x]? Progressing: []? Met: []? Not Met: []? Adjusted     Therapist goals for Patient:   Short Term Goals: To be achieved in: 2 weeks  1. Independent in HEP and progression per patient tolerance, in order to prevent re-injury. [x]? Progressing: []? Met: []? Not Met: []? Adjusted  2. Patient will have a decrease in pain to facilitate improvement in movement, function, and ADLs as indicated by Functional Deficits. [x]? Progressing: []? Met: []? Not Met: []? Adjusted     Long Term Goals: To be achieved in: 8 weeks  1. Disability index score of 44% or less for the LEFS to assist with reaching prior level of function. [x]? Progressing: []? Met: []? Not Met: []? Adjusted  2. Patient will demonstrate increased AROM to -5 to 130 to allow for proper joint functioning as indicated by patients Functional Deficits. [x]? Progressing: []? Met: []? Not Met: []? Adjusted  3. Patient will demonstrate an increase in Strength to good proximal hip strength and control, 4+/5 in LE to allow for proper functional mobility as indicated by patients Functional Deficits. [x]? Progressing: []? Met: []? Not Met: []? Adjusted  4. Patient will return to daily yardwork functional activities without increased symptoms or restriction. [x]? Progressing: []? Met: []? Not Met: []? Adjusted  5. Pt will be able to golf 9 holes without restrictions from knee pain. (patient specific functional goal)    []? Progressing: []? Met: []? Not Met: []? Adjusted              ASSESSMENT:   Patient is demonstrating improving L quad strength with increased resistance used for therex today. He was re-educated on HEP and demonstrates good understanding this date. Plan for D/C NV. End of session PT discussed with patient options in PT for specific Parkinson's disease training. Patient is being seen by a neurologist for possible Parkinson's disease. Recommend update HEP and discharge after next 2 visits due to lack of progress and motivation at this time. Pt will continue to progress if he continues his HEP. Overall Progression Towards Functional goals/ Treatment Progress Update:  [] Patient is progressing as expected towards functional goals listed. [x] Progression is slowed due to complexities/Impairments listed. [] Progression has been slowed due to co-morbidities. [] Plan just implemented, too soon to assess goals progression <30days   [] Goals require adjustment due to lack of progress  [] Patient is not progressing as expected and requires additional follow up with physician  [x] Other: see assessment    Prognosis for POC: [x] Good [x] Fair  [] Poor      Patient requires continued skilled intervention: [x] Yes  [] No    Treatment/Activity Tolerance:  [x] Patient able to complete treatment  [] Patient limited by fatigue  [] Patient limited by pain    [] Patient limited by other medical complications  [] Other:     PLAN: D/C NV.    [x] Continue per plan of care [] Alter current plan (see comments above)  [] Plan of care initiated [] Hold pending MD visit [] Discharge      Electronically signed by:  Vidya Feldman, PT, DPT, [facility] [id number]    Note: If patient does not return for scheduled/ recommended follow up visits, this note will serve as a discharge from care along with most recent update on progress.

## 2020-11-12 ENCOUNTER — HOSPITAL ENCOUNTER (OUTPATIENT)
Dept: PHYSICAL THERAPY | Age: 70
Setting detail: THERAPIES SERIES
Discharge: HOME | End: 2020-11-12
Payer: MEDICARE

## 2020-11-12 PROCEDURE — 97112 NEUROMUSCULAR REEDUCATION: CPT

## 2020-11-12 PROCEDURE — 97110 THERAPEUTIC EXERCISES: CPT

## 2020-11-12 NOTE — DISCHARGE SUMMARY
[facility],  [address] [place]  Phone: [phone]  Fax [phone]    Physical Therapy Treatment Note/ Discharge Summary:           Date:  2020    Patient Name:  Estrella Buckley    :  1950  MRN: [id number]    Medical/Treatment Diagnosis Information:  · Diagnosis: L knee M23.42 (ICD-10-CM) - Loose body of left knee  · Treatment Diagnosis: L knee pain (M25.652)    L knee stiffness (M25.662)  Difficulty walking (R26.2); s/p L knee LBR/chomdroplasty of PFJ and medial compartment   Insurance/Certification information:  PT Insurance Information: Medicare/Medicaid  Physician Information:  Referring Practitioner: Elder Butts MD    Date of Patient follow up with Physician and OP note due: 10/15/20    Latex Allergy/Pacemaker/Adhesive allergy:  [x]NO      []YES      Is this a Progress Report:     []  Yes  [x]  No      If Yes:  Date Range for reporting period:  Beginnin20  Endin2020    Progress report will be due (10 Rx or 30 days ):    10/24/20 (done 10/14/20 with OP note/MD update)   20 or visit 16      Recertification will be due (POC Duration  / 90 days ): 20     Has the plan of care been signed (Y/N):        [x]  Yes  []  No         Visit # Date Range Insurance Allowable Requires auth   13 20- [facility]    [x]no        []yes:           SUBJECTIVE:    Pt reports some soreness but overall improvement.       Pain level:  2/10 Pain increases with activity     OBJECTIVE: See below   Observation: able to contract quad, unable to perform glute contraction without sub from oppo glute or HF more than 10% of the time    PT Practice Pattern:D  Co morbidities:3+  SURgical 20 L knee LBR chondroplasty  INITIAL VISIT 20 CURRENT VISIT 20   PAIN 0-10/10 4-6/10 2/10   FUNCTIONAL SCALE LEFS (10/80) 88% (55/80) 31% disability    L knee AROM KE -5 -2 following stretching     127                           STRENGHT (MMT) Quad tone Fair Good    KE NT 4+/5    KF NT 4/5    HF NT 4+/5                     RESTRICTIONS/PRECAUTIONS: vertigo    Exercises/Interventions:     HEP:Access Code: [email or web address]   Patient Portal: [employer]/       Therapeutic Ex (53659) Reps/Sets/time Notes/CUES HEP   See previous noted for full HEP   X             Supine HF stretch 30\"x3 B Pt reported stretch felt where he has been having pain    SLRF 3x10, 3\"H No quad lag noted     Incline BLEs gastroc/soleus 3x30\" ea     Trustretch HS,KF, ankle DF stretch 3x30\" ea            (60#) 20x3\"ea  (45#) 10xea  (60#) 15x3\" ea  (45#)3x10  Focus on pelvic neutral for all exercises  R HF causes pain in L knee due to extension           LP      DL 3x10 80#    2 up, 1 down 3x10 60#          [facility], [facility]\"H                               Recumbent bike 5' L3 Warm up and ROM              Pt education x6' Glute, quad, gait, and why swelling is still present (see previous ed notes), caution with activity (all reviewed again today)          Manual Intervention (23954)                                  NMR re-education (23988)  CUES NEEDED                 No NMES 20x3\"           Lateral band walk GVL 15ft 3 laps   Band for HEP X11/4   \"robot walk\" with band at knees GVL 25ft 2 laps Issued band for HEP  Cues for KE X 11/4         Therapeutic Activity (31514)                                                Therapeutic Exercise and NMR EXR  [x] (27516) Provided verbal/tactile cueing for activities related to strengthening, flexibility, endurance, ROM for improvements in LE, proximal hip, and core control with self care, mobility, lifting, ambulation.   [x] (02677) Provided verbal/tactile cueing for activities related to improving balance, coordination, kinesthetic sense, posture, motor skill, proprioception  to assist with LE, proximal hip, and core control in self care, mobility, lifting, ambulation and eccentric single leg control. NMR and Therapeutic Activities:    [x] (51180 or 25946) Provided verbal/tactile cueing for activities related to improving balance, coordination, kinesthetic sense, posture, motor skill, proprioception and motor activation to allow for proper function of core, proximal hip and LE with self care and ADLs  [] (98663) Gait Re-education- Provided training and instruction to the patient for proper LE, core and proximal hip recruitment and positioning and eccentric body weight control with ambulation re-education including up and down stairs     Home Exercise Program:    [x] (68207) Reviewed/Progressed HEP activities related to strengthening, flexibility, endurance, ROM of core, proximal hip and LE for functional self-care, mobility, lifting and ambulation/stair navigation   [] (23341)Reviewed/Progressed HEP activities related to improving balance, coordination, kinesthetic sense, posture, motor skill, proprioception of core, proximal hip and LE for self care, mobility, lifting, and ambulation/stair navigation      Manual Treatments:  PROM / STM / Oscillations-Mobs:  G-I, II, III, IV (PA's, Inf., Post.)  [] (47174) Provided manual therapy to mobilize LE, proximal hip and/or LS spine soft tissue/joints for the purpose of modulating pain, promoting relaxation,  increasing ROM, reducing/eliminating soft tissue swelling/inflammation/restriction, improving soft tissue extensibility and allowing for proper ROM for normal function with self care, mobility, lifting and ambulation. Trigger point Dry Needling:  fine needle insertion into myofascial trigger points to stimulate a healing response  [] (97937) Needle insertion without injection: 1 or 2 muscles  [] (34283) Needle insertion without injection: 3 or more muscles    Modalities:     [] GAME READY (VASO)- for significant edema, swelling, pain control.     [] ESU (HV/PM) for edema and pain control       Charges:  Timed Code Treatment Minutes: 37'   Total Treatment Minutes: 37'         [] EVAL (LOW) 29882 (typically 20 minutes face-to-face)  [] EVAL (MOD) 81129 (typically 30 minutes face-to-face)  [] EVAL (HIGH) 84889 (typically 45 minutes face-to-face)  [] RE-EVAL     [x] WM(06891) x 2   [] IONTO  [x] NMR (44567) x  1   [] VASO  [] Manual (87169) x      [] Other:  [] TA x      [] Mech Traction (58246)  [] ES(attended) (92880)      [] ES (un) (55270): Biphasic NMES    GOALS:     Patient stated goal: To walk and play golf again. [x]? Progressing: []? Met: []? Not Met: []? Adjusted     Therapist goals for Patient:   Short Term Goals: To be achieved in: 2 weeks  1. Independent in HEP and progression per patient tolerance, in order to prevent re-injury. []? Progressing: [x]? Met: []? Not Met: []? Adjusted  2. Patient will have a decrease in pain to facilitate improvement in movement, function, and ADLs as indicated by Functional Deficits. []? Progressing: [x]? Met: []? Not Met: []? Adjusted     Long Term Goals: To be achieved in: 8 weeks  1. Disability index score of 44% or less for the LEFS to assist with reaching prior level of function. [x]? Progressing: []? Met: []? Not Met: []? Adjusted  2. Patient will demonstrate increased AROM to -5 to 130 to allow for proper joint functioning as indicated by patients Functional Deficits. [x]? Progressing: []? Met: []? Not Met: []? Adjusted  3. Patient will demonstrate an increase in Strength to good proximal hip strength and control, 4+/5 in LE to allow for proper functional mobility as indicated by patients Functional Deficits. [x]? Progressing: []? Met: []? Not Met: []? Adjusted  4. Patient will return to daily yardwork functional activities without increased symptoms or restriction. [x]? Progressing: []? Met: []? Not Met: []? Adjusted  5. Pt will be able to golf 9 holes without restrictions from knee pain. (patient specific functional goal) - not attempted   []? Progressing: []? Met: [x]?  Not Met: []? Adjusted              ASSESSMENT:   Patient is demonstrating improvement since beginning skilled PT with reduction in pain, improved ROM, and increased strength of his L LE. He continues to utilize his knee brace however is limited in full KE and resultant antalgic gait pattern. He is able to perform nearly all activities at  Home with some soreness in his L knee following. PT discussed with patient updated HEP and importance of continued performance post discharge for progression and maintenance of his strength. PT also discussed with patient use of SPC and gait retraining with SPC for use when he has more pain and soreness in his Left knee in order to off-load the joint. Patient was able to demonstrate safe use of SPC in clinic. As patient has platued in his progress he is appropriate for D/C from skilled PT with continuation of HEP. Overall Progression Towards Functional goals/ Treatment Progress Update:  [] Patient is progressing as expected towards functional goals listed. [x] Progression is slowed due to complexities/Impairments listed. [] Progression has been slowed due to co-morbidities. [] Plan just implemented, too soon to assess goals progression <30days   [] Goals require adjustment due to lack of progress  [] Patient is not progressing as expected and requires additional follow up with physician  [x] Other: see assessment    Prognosis for POC: [x] Good [x] Fair  [] Poor      Patient requires continued skilled intervention: [x] Yes  [] No    Treatment/Activity Tolerance:  [x] Patient able to complete treatment  [] Patient limited by fatigue  [x] Patient limited by pain    [] Patient limited by other medical complications  [] Other:     PLAN: D/C to HEP.    [] Continue per plan of care [] Alter current plan (see comments above)  [] Plan of care initiated [] Hold pending MD visit [x] Discharge      Electronically signed by:  Cory Mitchell, PT, DPT, OCS [id number]    Note: If patient does not

## 2021-01-25 ENCOUNTER — OFFICE VISIT (OUTPATIENT)
Dept: ORTHOPEDIC SURGERY | Age: 71
End: 2021-01-25
Payer: MEDICARE

## 2021-01-25 VITALS — HEIGHT: 72 IN | WEIGHT: 175 LBS | BODY MASS INDEX: 23.7 KG/M2

## 2021-01-25 DIAGNOSIS — M25.562 LEFT KNEE PAIN, UNSPECIFIED CHRONICITY: Primary | ICD-10-CM

## 2021-01-25 DIAGNOSIS — M17.12 PRIMARY OSTEOARTHRITIS OF LEFT KNEE: ICD-10-CM

## 2021-01-25 PROCEDURE — 1123F ACP DISCUSS/DSCN MKR DOCD: CPT | Performed by: PHYSICIAN ASSISTANT

## 2021-01-25 PROCEDURE — 3017F COLORECTAL CA SCREEN DOC REV: CPT | Performed by: PHYSICIAN ASSISTANT

## 2021-01-25 PROCEDURE — G8427 DOCREV CUR MEDS BY ELIG CLIN: HCPCS | Performed by: PHYSICIAN ASSISTANT

## 2021-01-25 PROCEDURE — G8484 FLU IMMUNIZE NO ADMIN: HCPCS | Performed by: PHYSICIAN ASSISTANT

## 2021-01-25 PROCEDURE — G8420 CALC BMI NORM PARAMETERS: HCPCS | Performed by: PHYSICIAN ASSISTANT

## 2021-01-25 PROCEDURE — 4040F PNEUMOC VAC/ADMIN/RCVD: CPT | Performed by: PHYSICIAN ASSISTANT

## 2021-01-25 PROCEDURE — 1036F TOBACCO NON-USER: CPT | Performed by: PHYSICIAN ASSISTANT

## 2021-01-25 PROCEDURE — 99214 OFFICE O/P EST MOD 30 MIN: CPT | Performed by: PHYSICIAN ASSISTANT

## 2021-01-25 NOTE — PROGRESS NOTES
CHIEF COMPLAINT:    Chief Complaint   Patient presents with    Knee Pain     CK LEFT KNEE SX:9/22/20-STILL PAINFUL       HISTORY OF PRESENT ILLNESS:                The patient is a 73 y.o. male this patient presents today for follow-up regarding his LEFT knee. He had arthroscopic surgery 9/22/2020 including chondroplasty and synovectomy. He had fairly severe arthritis at that time but was having very distinct mechanical symptoms and elected to proceed with arthroscopic intervention. He reports that overall, his mechanical catching has improved but he still having quite a bit of pain to the medial aspect of his knee. He is extremely active. He enjoys hiking and bicycling. He did have a cortisone injection prior to arthroscopy which gave him very temporary relief.   He is interested in discussing total joint replacement  Past Medical History:   Diagnosis Date    Arthritis     Cancer [facility])     prostate    Lung infection     recurring lung infection- do not not what the cause is          The pain assessment was noted & is as follows:  Pain Assessment  Location of Pain: Knee  Location Modifiers: Left  Severity of Pain: 5  Quality of Pain: Sharp, Dull, Aching  Duration of Pain: Persistent  Frequency of Pain: Several times daily  Aggravating Factors: Stretching, Bending, Walking, Stairs  Limiting Behavior: Some  Relieving Factors: Rest]      Work Status/Functionality:     Past Medical History: Medical history form was reviewed today & can be found in the media tab  Past Medical History:   Diagnosis Date    Arthritis     Cancer ([facility] [address]     prostate    Lung infection     recurring lung infection- do not not what the cause is      Past Surgical History:     Past Surgical History:   Procedure Laterality Date    CERVICAL DISC SURGERY      COLECTOMY      due to diverticulitis    KNEE ARTHROSCOPY Left     X 2    KNEE ARTHROSCOPY Left 9/22/2020    EXAM UNDER ANESTHESIA VIDEO ARTHROSCOPY LEFT KNEE, REMOVAL OF MULTIPLE LOOSE BODIES, CHONDROPLASTY, SYNOVECTOMY performed by Odilon Murrell MD at [address] ARTHROSCOPY Bilateral     acromioplasty     Current Medications:     Current Outpatient Medications:     fluticasone (FLONASE) 50 MCG/ACT nasal spray, 1 spray by Each Nostril route daily as needed for Rhinitis, Disp: , Rfl:     aspirin 325 MG EC tablet, Take 1 tablet by mouth daily for 14 days Take for 14 days postop, Disp: 14 tablet, Rfl: 0    amphetamine-dextroamphetamine (ADDERALL) 10 MG tablet, Take 10 mg by mouth 2 times daily. , Disp: , Rfl:     traZODone (DESYREL) 50 MG tablet, Take 50 mg by mouth nightly, Disp: , Rfl:     allopurinol (ZYLOPRIM) 100 MG tablet, Take 100 mg by mouth daily , Disp: , Rfl:   Allergies:  Mometasone furo-formoterol fum  Social History:    reports that he quit smoking about 40 years ago. His smoking use included cigarettes. He has never used smokeless tobacco. He reports current alcohol use. He reports that he does not use drugs. Family History:   Family History   Problem Relation Age of Onset    Heart Disease Mother         CHF    Stroke Father        REVIEW OF SYSTEMS:   For new problems, a full review of systems will be found scanned in the patient's chart. CONSTITUTIONAL: Denies unexplained weight loss, fevers, chills   NEUROLOGICAL: Denies unsteady gait or progressive weakness  SKIN: Denies skin changes, delayed healing, rash, itching       PHYSICAL EXAM:    Vitals: Height 6' 0.01\" (1.829 m), weight 175 lb (79.4 kg). GENERAL EXAM:  · General Apparence: Patient is adequately groomed with no evidence of malnutrition. · Orientation: The patient is oriented to time, place and person. · Mood & Affect:The patient's mood and affect are appropriate       LEFT knee PHYSICAL EXAMINATION:  · Inspection: Upon inspection, there is a varus deformity, well-healed surgical incisions consistent with past medical history.   Trace swelling but no effusion    · Palpation: Tender through the medial compartment in particular    · Range of Motion: Motion today is approximately 0 to 110 degrees with stiffness    · Strength: The extensor mechanism is intact    · Special Tests: ACL PCL MCL and LCL feel stable          · Skin:  There are no rashes, ulcerations or lesions. · There are no distal dysvascular changes     Gait & station: The patient ambulates today with an antalgic gait favoring the LEFT knee      Additional Examinations:        Right Lower Extremity: Examination of the right lower extremity does not show any tenderness, deformity or injury. Range of motion is unremarkable. There is no gross instability. There are no rashes, ulcerations or lesions. Strength and tone are normal.      Diagnostic Testing: The following x rays were read and interpreted by myself      1.  3 x-ray views the LEFT knee demonstrate severe osteoarthritis tricompartmental with bone-on-bone patellofemoral and medial compartment, bony sclerosis and associated osteophyte formation    Orders     Orders Placed This Encounter   Procedures    XR KNEE LEFT (3 VIEWS)     Standing Status:   Future     Number of Occurrences:   1     Standing Expiration Date:   1/25/2022     Order Specific Question:   Reason for exam:     Answer:   PAIN         Assessment / Treatment Plan:     1. Severe LEFT knee osteoarthritis with significant structural change    The patient has had physical therapy, arthroscopic intervention and cortisone injection with continued pain. He would like to move forward with a LEFT total knee replacement using Indicee arthroplasty    He will call to discuss date options when he is ready to make a decision    We discussed the risk, benefits, and potential complications of total knee replacement arthroplasty surgery.  The patient voiced their understanding to concerns that include infection, deep vein thrombosis, neurological injury, and delayed rehabilitation. The patient also realizes that there are concerns regarding the potential need for manipulation under anesthesia if range of motion proves to be problematic. The patient also understands that there is always a chance of dystrophy and anesthetic complications that would include a stroke, cardiopulmonary pathology, and even death. We also discussed the rehabilitation involved with this operation and options that involved not only the hospitalization but then the potential of either going home with visiting home therapy versus going to a rehabilitation facility. All questions were answered. I spent 25 minutes face-to-face with the patient and greater than 50% of that time was spent counseling/coordinating care for the above-stated diagnosis and treatment.

## 2021-01-29 ENCOUNTER — TELEPHONE (OUTPATIENT)
Dept: ORTHOPEDIC SURGERY | Age: 71
End: 2021-01-29

## 2021-05-25 ENCOUNTER — TELEPHONE (OUTPATIENT)
Dept: ORTHOPEDIC SURGERY | Age: 71
End: 2021-05-25

## 2021-05-25 NOTE — TELEPHONE ENCOUNTER
SCANNED INTO  [facility] ( [facility] ) 08/01/2020 TO 03/01/2021 FOR PATIENT TO  AT [facility]. L/M WITH  AND XRAY FOR A CD 08/01/2020 THRU 03/01/2021 DR. CLARKE TO TAKE UP TO  WITH PAPERWORK AND TO CALL PATIENT WHEN CD IS READY

## (undated) DEVICE — Z CONVERTED USE 2273232 BANDAGE COMPR W6INXL11YD E KNIT DBL SELF CLSR EZE-BAND

## (undated) DEVICE — SOLUTION IRRIG 5L LAC R BG

## (undated) DEVICE — PADDING CAST W4INXL4YD NONSTERILE COT RAYON MICROPLEATED

## (undated) DEVICE — TUBING PMP L8FT LNG W/ CONN FOR AR-6400 REDEUCE

## (undated) DEVICE — T-DRAPE,EXTREMITY,STERILE: Brand: MEDLINE

## (undated) DEVICE — GLOVE,SURG,SENSICARE,ALOE,LF,PF,7: Brand: MEDLINE

## (undated) DEVICE — STERILE POLYISOPRENE POWDER-FREE SURGICAL GLOVES: Brand: PROTEXIS

## (undated) DEVICE — SUTURE MCRYL SZ 4-0 L18IN ABSRB UD L19MM PS-2 3/8 CIR PRIM Y496G

## (undated) DEVICE — ZIMMER® STERILE DISPOSABLE TOURNIQUET CUFF WITH PLC, DUAL PORT, SINGLE BLADDER, 30 IN. (76 CM)

## (undated) DEVICE — GAUZE,SPONGE,4"X4",16PLY,STRL,LF,10/TRAY: Brand: MEDLINE

## (undated) DEVICE — PADDING CAST W6INXL4YD ST COT RAYON MICROPLEATED HIGHLY

## (undated) DEVICE — SET GRAV VENT NVENT CK VLV 3 NDL FREE PRT 10 GTT

## (undated) DEVICE — GOWN,SIRUS,NON REINFRCD,LARGE,SET IN SL: Brand: MEDLINE

## (undated) DEVICE — 3M™ TEGADERM™ TRANSPARENT FILM DRESSING FRAME STYLE, 1624W, 2-3/8 IN X 2-3/4 IN (6 CM X 7 CM), 100/CT 4CT/CASE: Brand: 3M™ TEGADERM™

## (undated) DEVICE — CATHETER IV 20GA L1.25IN PNK FEP SFTY STR HUB RADPQ DISP

## (undated) DEVICE — SET ADMIN PRIMING 7ML L30IN 7.35LB 20 GTT 2ND RLER CLMP

## (undated) DEVICE — DYONICS 4.0 MM ELITE                                    ACROMIOBLASTER STRAIGHT DISPOSABLE                                    BURRS, SAGE GREEN, 10000 MAXIMUM                                    RPM, PACKAGED 6 PER BOX, STERILE

## (undated) DEVICE — SOLUTION IV 1000ML LAC RINGERS PH 6.5 INJ USP VIAFLX PLAS

## (undated) DEVICE — 3M™ STERI-STRIP™ REINFORCED ADHESIVE SKIN CLOSURES, R1547, 1/2 IN X 4 IN (12 MM X 100 MM), 6 STRIPS/ENVELOPE: Brand: 3M™ STERI-STRIP™

## (undated) DEVICE — PACK PROCEDURE SURG ARTHSCP CUST

## (undated) DEVICE — TUBE IRRIG L8IN LNG PT W/ CONN FOR PMP SYS REDEUCE

## (undated) DEVICE — 4.5 MM INCISOR PLUS STRAIGHT                                    BLADES, POWER/EP-1, VIOLET, PACKAGED                                    6 PER BOX, STERILE